# Patient Record
Sex: FEMALE | Race: WHITE | Employment: UNEMPLOYED | URBAN - METROPOLITAN AREA
[De-identification: names, ages, dates, MRNs, and addresses within clinical notes are randomized per-mention and may not be internally consistent; named-entity substitution may affect disease eponyms.]

---

## 2018-03-20 ENCOUNTER — OFFICE VISIT (OUTPATIENT)
Dept: ORTHOPEDIC SURGERY | Age: 58
End: 2018-03-20

## 2018-03-20 VITALS — WEIGHT: 190.04 LBS | HEIGHT: 65 IN | BODY MASS INDEX: 31.66 KG/M2

## 2018-03-20 DIAGNOSIS — R52 PAIN: Primary | ICD-10-CM

## 2018-03-20 DIAGNOSIS — M19.049 CMC ARTHRITIS: ICD-10-CM

## 2018-03-20 DIAGNOSIS — S63.602A SPRAIN OF LEFT THUMB, UNSPECIFIED SITE OF FINGER, INITIAL ENCOUNTER: ICD-10-CM

## 2018-03-20 PROCEDURE — G8484 FLU IMMUNIZE NO ADMIN: HCPCS | Performed by: ORTHOPAEDIC SURGERY

## 2018-03-20 PROCEDURE — G8417 CALC BMI ABV UP PARAM F/U: HCPCS | Performed by: ORTHOPAEDIC SURGERY

## 2018-03-20 PROCEDURE — 99203 OFFICE O/P NEW LOW 30 MIN: CPT | Performed by: ORTHOPAEDIC SURGERY

## 2018-03-20 PROCEDURE — 3017F COLORECTAL CA SCREEN DOC REV: CPT | Performed by: ORTHOPAEDIC SURGERY

## 2018-03-20 PROCEDURE — 3014F SCREEN MAMMO DOC REV: CPT | Performed by: ORTHOPAEDIC SURGERY

## 2018-03-20 PROCEDURE — G8427 DOCREV CUR MEDS BY ELIG CLIN: HCPCS | Performed by: ORTHOPAEDIC SURGERY

## 2018-03-27 ENCOUNTER — HOSPITAL ENCOUNTER (OUTPATIENT)
Dept: MRI IMAGING | Age: 58
Discharge: HOME | End: 2018-03-27
Attending: ORTHOPAEDIC SURGERY | Admitting: ORTHOPAEDIC SURGERY

## 2018-03-27 DIAGNOSIS — S63.602A SPRAIN OF LEFT THUMB: ICD-10-CM

## 2018-03-27 DIAGNOSIS — S63.602A SPRAIN OF LEFT THUMB, UNSPECIFIED SITE OF FINGER, INITIAL ENCOUNTER: ICD-10-CM

## 2018-04-09 ENCOUNTER — OFFICE VISIT (OUTPATIENT)
Dept: ORTHOPEDIC SURGERY | Age: 58
End: 2018-04-09

## 2018-04-09 VITALS — WEIGHT: 190.04 LBS | BODY MASS INDEX: 31.66 KG/M2 | HEIGHT: 65 IN

## 2018-04-09 DIAGNOSIS — M19.049 CMC ARTHRITIS: Primary | ICD-10-CM

## 2018-04-09 DIAGNOSIS — M79.645 THUMB PAIN, LEFT: ICD-10-CM

## 2018-04-09 DIAGNOSIS — S63.602A SPRAIN OF LEFT THUMB, UNSPECIFIED SITE OF FINGER, INITIAL ENCOUNTER: ICD-10-CM

## 2018-04-09 PROCEDURE — 3014F SCREEN MAMMO DOC REV: CPT | Performed by: ORTHOPAEDIC SURGERY

## 2018-04-09 PROCEDURE — 3017F COLORECTAL CA SCREEN DOC REV: CPT | Performed by: ORTHOPAEDIC SURGERY

## 2018-04-09 PROCEDURE — G8417 CALC BMI ABV UP PARAM F/U: HCPCS | Performed by: ORTHOPAEDIC SURGERY

## 2018-04-09 PROCEDURE — G8427 DOCREV CUR MEDS BY ELIG CLIN: HCPCS | Performed by: ORTHOPAEDIC SURGERY

## 2018-04-09 PROCEDURE — 99213 OFFICE O/P EST LOW 20 MIN: CPT | Performed by: ORTHOPAEDIC SURGERY

## 2018-04-09 PROCEDURE — 1036F TOBACCO NON-USER: CPT | Performed by: ORTHOPAEDIC SURGERY

## 2019-02-26 ENCOUNTER — TELEPHONE (OUTPATIENT)
Dept: ORTHOPEDIC SURGERY | Age: 59
End: 2019-02-26

## 2019-05-06 ENCOUNTER — HOSPITAL ENCOUNTER (EMERGENCY)
Age: 59
Discharge: HOME | End: 2019-05-06
Payer: MEDICARE

## 2019-05-06 VITALS
RESPIRATION RATE: 20 BRPM | WEIGHT: 179.13 LBS | BODY MASS INDEX: 29.84 KG/M2 | HEART RATE: 89 BPM | TEMPERATURE: 98.2 F | HEIGHT: 65 IN | SYSTOLIC BLOOD PRESSURE: 129 MMHG | DIASTOLIC BLOOD PRESSURE: 88 MMHG | OXYGEN SATURATION: 99 %

## 2019-05-06 DIAGNOSIS — H61.23 BILATERAL IMPACTED CERUMEN: Primary | ICD-10-CM

## 2019-05-06 PROCEDURE — 99283 EMERGENCY DEPT VISIT LOW MDM: CPT

## 2019-05-06 PROCEDURE — 4500000023 HC ED LEVEL 3 PROCEDURE

## 2019-05-06 NOTE — ED PROVIDER NOTES
[facility] Emergency Department    CHIEF COMPLAINT  Ear Fullness (ear fullness for past several days patient reports having to have ears cleaned out before )      HISTORY OF PRESENT ILLNESS  Lizbeth Altman is a 66 y.o. female who presents to the ED complaining of ear fullness and needing ears cleaned out over the past 2-3 days she has noticed worsening symptoms. Patient reports she has a history of cerumen impaction has seen ear nose and throat in the past. Patient reports that her ENT doctor retired and she has not followed up with anyone new. Patient reports that left ear feels more full than right but both have had fullness over the past 2 days. No dizziness or lightheadedness. No headache or blurred vision. No fever or chills. No sore throat or otalgia. No chest pain or shortness breath. No nausea, vomiting, or diarrhea. No other complaints, modifying factors or associated symptoms. Nursing notes reviewed. Past Medical History:   Diagnosis Date    Hypertension     Influenza A 03/23/2017    Thyroid disease      Past Surgical History:   Procedure Laterality Date    APPENDECTOMY      CHOLECYSTECTOMY      HYSTERECTOMY       History reviewed. No pertinent family history.   Social History     Socioeconomic History    Marital status:      Spouse name: Not on file    Number of children: Not on file    Years of education: Not on file    Highest education level: Not on file   Occupational History    Not on file   Social Needs    Financial resource strain: Not on file    Food insecurity:     Worry: Not on file     Inability: Not on file    Transportation needs:     Medical: Not on file     Non-medical: Not on file   Tobacco Use    Smoking status: Former Smoker    Smokeless tobacco: Never Used   Substance and Sexual Activity    Alcohol use: No    Drug use: No    Sexual activity: Never   Lifestyle    Physical activity:     Days per week: Not on file     Minutes per session: Not on file    Stress: Not on file   Relationships    Social connections:     Talks on phone: Not on file     Gets together: Not on file     Attends Gnosticist service: Not on file     Active member of club or organization: Not on file     Attends meetings of clubs or organizations: Not on file     Relationship status: Not on file    Intimate partner violence:     Fear of current or ex partner: Not on file     Emotionally abused: Not on file     Physically abused: Not on file     Forced sexual activity: Not on file   Other Topics Concern    Not on file   Social History Narrative    Not on file     No current facility-administered medications for this encounter. Current Outpatient Medications   Medication Sig Dispense Refill    gabapentin (NEURONTIN) 400 MG capsule Take 400 mg by mouth 3 times daily as needed      phenol (SORE THROAT) 1.4 % AERS Take 1 spray by mouth every 2 hours as needed (sore throat) 117 mL 0    sodium chloride (OCEAN) 0.65 % nasal spray 1 spray by Nasal route as needed for Congestion 1 Bottle 3    lisinopril (PRINIVIL;ZESTRIL) 10 MG tablet Take 20 mg by mouth daily.  levothyroxine (SYNTHROID) 150 MCG tablet Take 125 mcg by mouth Daily. Allergies   Allergen Reactions    Sulfa Antibiotics        REVIEW OF SYSTEMS  6 systems reviewed, pertinent positives per HPI otherwise noted to be negative    PHYSICAL EXAM  /88   Pulse 89   Temp 98.2 °F (36.8 °C) (Oral)   Resp 20   Ht 5' 5\" (1.651 m)   Wt 179 lb 2 oz (81.3 kg)   SpO2 99%   BMI 29.81 kg/m²   GENERAL APPEARANCE: Awake and alert. Cooperative. No acute distress. HEAD: Normocephalic. Atraumatic. EYES: PERRL. EOM's grossly intact. ENT: Mucous membranes are pink and moist. Nares unobstructed, right TM intact with slight cerumen present to auditory canal, no erythema or exudate present. Left TM unable to visualize due to cerumen impaction. NECK: Supple. Normal ROM.    CHEST: Equal symmetric chest rise.   LUNGS: Breathing is unlabored. Speaking comfortably in full sentences. Abdomen: Nondistended  EXTREMITIES: MAEE. No acute deformities. SKIN: Warm and dry. NEUROLOGICAL: Alert and oriented. Strength is 5/5 in all extremities and sensation is intact. RADIOLOGY  No radiological imaging needed at this time. .      ED COURSE   I have evaluated this patient on my own per my scope of practice. Supervising physician available during ED course of treatment for consultation     Vital signs stable. Patient did not require pain medications while in the [facility] department. Ears were irrigated per RN and patient tolerated well. Moderate amount of cerumen was removed. After cerumen impaction performed I was able to visualize bilateral TMs which appear to be intact. No significant erythema or bulging noted. Slight excoriation noted to auditory canal otherwise no exudates or abnormalities noted. Patient was instructed not to stick anything in to auditory canals and follow up with ENT for frequent cerumen impaction. A discussion was had with Mrs. Hortensia Diaz regarding the findings, results, and follow-up. All questions were answered. Patient will follow up with  PCP and ENT for further evaluation/treatment. Patient will return to ED for new/worsening symptoms. MDM  I estimate there is LOW risk for CELLULITIS, COMPARTMENT SYNDROME, NECROTIZING FASCIITIS, TENDON OR NEUROVASCULAR INJURY, or FOREIGN BODY, thus I consider the discharge disposition reasonable. Also, there is no evidence or peritonitis, sepsis, or toxicity. Tiffanie FLOREZ [id number] and I have discussed the diagnosis and risks, and we agree with discharging home to follow-up with their primary doctor. We also discussed returning to the Emergency Department immediately if new or worsening symptoms occur.  We have discussed the symptoms which are most concerning (e.g., changing or worsening pain, fever, numbness, weakness, cool or painful digits) that necessitate immediate return. Final Impression    1. Bilateral impacted cerumen        Discharge Vital Signs:  Blood pressure 129/88, pulse 89, temperature 98.2 °F (36.8 °C), temperature source Oral, resp. rate 20, height 5' 5\" (1.651 m), weight 179 lb 2 oz (81.3 kg), SpO2 99 %. DISPOSITION  Patient was discharged to home in good condition.           Mathew Rivera, ASHLEY - CNP  05/06/19 [id number]

## 2019-05-06 NOTE — ED NOTES
Pt instructed to follow up with [facility]. Assessed per Jory Bellamy NP.      Arlene Washburn LPN  95/77/99 [id number]

## 2019-07-15 ENCOUNTER — INITIAL CONSULT (OUTPATIENT)
Dept: SURGERY | Age: 59
End: 2019-07-15
Payer: MEDICARE

## 2019-07-15 VITALS
BODY MASS INDEX: 29.59 KG/M2 | WEIGHT: 177.6 LBS | HEIGHT: 65 IN | DIASTOLIC BLOOD PRESSURE: 68 MMHG | SYSTOLIC BLOOD PRESSURE: 116 MMHG

## 2019-07-15 DIAGNOSIS — K61.1 PERIRECTAL ABSCESS: Primary | ICD-10-CM

## 2019-07-15 PROCEDURE — G8427 DOCREV CUR MEDS BY ELIG CLIN: HCPCS | Performed by: SURGERY

## 2019-07-15 PROCEDURE — 99202 OFFICE O/P NEW SF 15 MIN: CPT | Performed by: SURGERY

## 2019-07-15 PROCEDURE — G8419 CALC BMI OUT NRM PARAM NOF/U: HCPCS | Performed by: SURGERY

## 2019-07-15 RX ORDER — CLINDAMYCIN HYDROCHLORIDE 300 MG/1
CAPSULE ORAL
COMMUNITY
Start: 2019-07-12 | End: 2019-07-15 | Stop reason: SDUPTHER

## 2019-07-15 RX ORDER — CLINDAMYCIN HYDROCHLORIDE 300 MG/1
300 CAPSULE ORAL 3 TIMES DAILY
Qty: 21 CAPSULE | Refills: 0 | Status: SHIPPED | OUTPATIENT
Start: 2019-07-15 | End: 2021-07-08

## 2019-07-28 NOTE — PROGRESS NOTES
[facility]    HPI:  Patient is 66y.o. year old female seen at request of Blair Perla MD.  She presents for evaluation of rectal pain. There has not been bleeding. There has been recent infection that drained spontaneously. She is taking antibiotics. She states things are much improved. .  The symptoms have been occurring for weeks. Antibiotic treatments have been tried. Past Medical History:   Diagnosis Date    Hypertension     Influenza A 03/23/2017    Thyroid disease        Past Surgical History:   Procedure Laterality Date    APPENDECTOMY      CHOLECYSTECTOMY      HYSTERECTOMY         Current Outpatient Medications on File Prior to Visit   Medication Sig Dispense Refill    lisinopril (PRINIVIL;ZESTRIL) 10 MG tablet Take 20 mg by mouth daily.  levothyroxine (SYNTHROID) 150 MCG tablet Take 125 mcg by mouth Daily.  gabapentin (NEURONTIN) 400 MG capsule Take 400 mg by mouth 3 times daily as needed      phenol (SORE THROAT) 1.4 % AERS Take 1 spray by mouth every 2 hours as needed (sore throat) (Patient not taking: Reported on 7/15/2019) 117 mL 0    sodium chloride (OCEAN) 0.65 % nasal spray 1 spray by Nasal route as needed for Congestion (Patient not taking: Reported on 7/15/2019) 1 Bottle 3     No current facility-administered medications on file prior to visit.         Allergies   Allergen Reactions    Sulfa Antibiotics        Social History     Socioeconomic History    Marital status:      Spouse name: Not on file    Number of children: Not on file    Years of education: Not on file    Highest education level: Not on file   Occupational History    Not on file   Social Needs    Financial resource strain: Not on file    Food insecurity:     Worry: Not on file     Inability: Not on file    Transportation needs:     Medical: Not on file     Non-medical: Not on file   Tobacco Use    Smoking status: Former Smoker    Smokeless tobacco: Never Used

## 2021-02-23 ENCOUNTER — TELEPHONE (OUTPATIENT)
Dept: ORTHOPEDIC SURGERY | Age: 61
End: 2021-02-23

## 2021-02-23 ENCOUNTER — OFFICE VISIT (OUTPATIENT)
Dept: ORTHOPEDIC SURGERY | Age: 61
End: 2021-02-23
Payer: MEDICARE

## 2021-02-23 VITALS — BODY MASS INDEX: 29.49 KG/M2 | HEIGHT: 65 IN | WEIGHT: 177 LBS

## 2021-02-23 DIAGNOSIS — S83.421A SPRAIN OF LATERAL COLLATERAL LIGAMENT OF RIGHT KNEE, INITIAL ENCOUNTER: ICD-10-CM

## 2021-02-23 DIAGNOSIS — M17.11 PRIMARY OSTEOARTHRITIS OF RIGHT KNEE: ICD-10-CM

## 2021-02-23 DIAGNOSIS — M25.561 RIGHT KNEE PAIN, UNSPECIFIED CHRONICITY: Primary | ICD-10-CM

## 2021-02-23 DIAGNOSIS — M22.41 CHONDROMALACIA OF RIGHT PATELLOFEMORAL JOINT: ICD-10-CM

## 2021-02-23 PROCEDURE — L1812 KO ELASTIC W/JOINTS PRE OTS: HCPCS | Performed by: PHYSICIAN ASSISTANT

## 2021-02-23 PROCEDURE — 99214 OFFICE O/P EST MOD 30 MIN: CPT | Performed by: PHYSICIAN ASSISTANT

## 2021-02-23 NOTE — PROGRESS NOTES
Chief Complaint    Knee Pain (RIGHT KNEE)      History of Present Illness:  Lennox Sawant is a 64 y.o. female presents to the office today for a new problem. Patient sent in orthopedic consultation for Dr. Carlotta Wilkerson. Patient is here with a chief complaint of right lateral knee pain. Around 2/18/2021 she did suffer an injury. She describes a hyperextension injury. Her pain is concentrated laterally. She was referred to us by her primary care physician for orthopedic care. No past medical history contributing to the region. Pain Assessment  Location of Pain: Knee  Location Modifiers: Right  Severity of Pain: 7  Quality of Pain: Dull, Sharp, Throbbing, Aching  Duration of Pain: Persistent  Frequency of Pain: Constant  Aggravating Factors: Bending, Stretching, Straightening, Exercise, Kneeling, Squatting, Standing, Stairs, Walking  Limiting Behavior: Yes  Relieving Factors: Rest  Result of Injury: Yes  Work-Related Injury: No  Are there other pain locations you wish to document?: No]    Medical History:  Past Medical History:   Diagnosis Date    Hypertension     Influenza A 03/23/2017    Thyroid disease      There are no active problems to display for this patient. Past Surgical History:   Procedure Laterality Date    APPENDECTOMY      CHOLECYSTECTOMY      HYSTERECTOMY       No family history on file.   Social History     Socioeconomic History    Marital status:      Spouse name: None    Number of children: None    Years of education: None    Highest education level: None   Occupational History    None   Social Needs    Financial resource strain: None    Food insecurity     Worry: None     Inability: None    Transportation needs     Medical: None     Non-medical: None   Tobacco Use    Smoking status: Former Smoker    Smokeless tobacco: Never Used   Substance and Sexual Activity    Alcohol use: No    Drug use: No    Sexual activity: Never   Lifestyle    Physical activity Neurological: The patient has good coordination. There is no weakness or sensory deficit. Body mass index is 29.45 kg/m². Right knee Examination:    Inspection:  No erythema or signs of infection. There are no cutaneous lesions    Palpation:  There is tenderness to palpation along the lateral joint line. Range of Motion:  0 extension to 120 of active flexion. Strength:  4+/5 quadriceps and hamstrings    Special Tests: The knee is stable to varus valgus stressing/anterior posterior drawer. Negative Homans test.                                 Skin: There are no rashes, ulcerations or lesions. Gait: mildly antalgic favoring the left side    Reflex 2+ patellar    Examination of the left knee reveals intact skin. There is no focal tenderness. The patient demonstrates full painless range of motion with regard to flexion and extension. Strength is 5/5 throughout all planes. Ligamentous stability is grossly intact. Examination of the right and left hip reveals intact skin. The patient demonstrates full painless range of motion with regards to flexion, abduction, internal and external rotation. There is no tenderness about the greater trochanter. There is a negative straight leg raise against resistance. Strength is 5/5 throughout all planes. Radiology:   X-rays obtained and reviewed in office:  Views 4 views including AP weightbearing, PA flexed weightbearing, lateral, and skyline  Location right knee:    Impression:   There is mild to moderate thinning of her lateral joint surface and patellofemoral joint surface with sclerosis and osteophyte formation present. No fractures are identified. Impression:  Encounter Diagnoses   Name Primary?     Right knee pain, unspecified chronicity Yes    Primary osteoarthritis of right knee     Sprain of lateral collateral ligament of right knee, initial encounter     Chondromalacia of right patellofemoral joint        Office Procedures: Orders Placed This Encounter   Procedures    XR KNEE RIGHT (MIN 4 VIEWS)     Standing Status:   Future     Number of Occurrences:   1     Standing Expiration Date:   2/23/2022     Order Specific Question:   Reason for exam:     Answer:   pain    MRI KNEE RIGHT WO CONTRAST     Standing Status:   Future     Standing Expiration Date:   2/23/2022     Scheduling Instructions:      NIA ALBERT     Order Specific Question:   Reason for exam:     Answer:   r/o occult lateral tibial plateau fx    Breg Economy Hinged Knee WrapAround Brace     Patient was prescribed a Breg Economy Hinged Wrap Around Knee Brace. The right knee will require stabilization / immobilization from this semi-rigid / rigid orthosis to improve their function. The orthosis will assist in protecting the affected area, provide functional support and facilitate healing. The patient was educated and fit by a healthcare professional with expert knowledge and specialization in brace application while under the direct supervision of the treating physician. Verbal and written instructions for the use of and application of this item were provided. They were instructed to contact the office immediately should the brace result in increased pain, decreased sensation, increased swelling or worsening of the condition. Treatment Plan:  I discussed with the patient the nature of osteoarthritis of the knee. We talked about treatment of arthritis and the various options that are involved with this. The patient understands that the treatments can vary from essentially doing nothing to a total joint replacement arthroplasty for arthritis. I then went on to describe the utilization of glucosamine and chondroitin sulfate as a joint nutrition product. We talked about the fact that this is essentially a joint vitamin with typically minimal side effects. We also talked about utilization of prescription over-the-counter anti-inflammatory medications as the next option. We also discussed the possibility of brace wear or orthotic wear if the patient has significant varus alignment. We then went on to discuss the possibility of Visco supplementation with hyaluronate products. We talked about the typical course of this type of treatment and the fact that often times in the treatment for significant arthritis, this is successful less than half the time. We also talked about the corticosteroid injections and the fact that this can give a brief window of relief, but does not cure the problem; in fact, the pain often has a rebound effect in 6-10 weeks after the steroid has worn off. We also discussed arthroscopy surgery in attempts to debride the joint, but the fact that this is relatively unreliable treatment in the face of significant arthritis. It can occasionally be used, particularly if there is significant meniscus pathology. Lastly we discussed total joint replacement arthroplasty as the final and definitive step in treatment of arthritis. Patient realizes the magnitude of this type of treatment as well as having voiced a general understanding to the duration of the prosthesis. The patient voiced understanding to these continuum of treatment options. Patient does have at least moderate osteoarthritis involving the lateral compartment of her knee. Ligaments appear stable at this time. She may have a mild LCL tear. I am more concerned about an occult fracture of her lateral femoral condyle and lateral tibial plateau. She will be placed in a economy hinged knee brace. She will be no more than 50% weightbearing with 2 crutches and knee brace at all times. She should continue to rest ice and elevate. We will order her an MRI to evaluate for above pathology. She is also asked for additional pain medication which I do not feel is appropriate at this time. She can continue to take over-the-counter medications and use ice as needed for pain control. The brace and crutches should also help with pain control. We will see her back in the office after MRI to review results.

## 2021-02-23 NOTE — TELEPHONE ENCOUNTER
SPOKE WITH PATIENT, WILL CALL TO SCHEDULE MRI WITH NIA. WILL CALL TO SCHEDULE FOLLOW UP APPT FOR RESULTS.

## 2021-02-26 ENCOUNTER — TELEPHONE (OUTPATIENT)
Dept: ORTHOPEDIC SURGERY | Age: 61
End: 2021-02-26

## 2021-03-03 ENCOUNTER — TELEPHONE (OUTPATIENT)
Dept: ORTHOPEDIC SURGERY | Age: 61
End: 2021-03-03

## 2021-03-03 ENCOUNTER — OFFICE VISIT (OUTPATIENT)
Dept: ORTHOPEDIC SURGERY | Age: 61
End: 2021-03-03
Payer: MEDICARE

## 2021-03-03 VITALS — HEIGHT: 65 IN | WEIGHT: 177 LBS | BODY MASS INDEX: 29.49 KG/M2

## 2021-03-03 DIAGNOSIS — M17.11 PRIMARY OSTEOARTHRITIS OF RIGHT KNEE: Primary | ICD-10-CM

## 2021-03-03 DIAGNOSIS — M22.41 CHONDROMALACIA OF RIGHT PATELLOFEMORAL JOINT: ICD-10-CM

## 2021-03-03 DIAGNOSIS — M23.300 DEGENERATIVE TEAR OF LATERAL MENISCUS OF RIGHT KNEE: ICD-10-CM

## 2021-03-03 PROCEDURE — 99214 OFFICE O/P EST MOD 30 MIN: CPT | Performed by: PHYSICIAN ASSISTANT

## 2021-03-03 PROCEDURE — 20611 DRAIN/INJ JOINT/BURSA W/US: CPT | Performed by: PHYSICIAN ASSISTANT

## 2021-03-03 RX ORDER — BUPIVACAINE HYDROCHLORIDE 2.5 MG/ML
10 INJECTION, SOLUTION INFILTRATION; PERINEURAL ONCE
Status: COMPLETED | OUTPATIENT
Start: 2021-03-03 | End: 2021-03-03

## 2021-03-03 RX ORDER — TRIAMCINOLONE ACETONIDE 40 MG/ML
80 INJECTION, SUSPENSION INTRA-ARTICULAR; INTRAMUSCULAR ONCE
Status: COMPLETED | OUTPATIENT
Start: 2021-03-03 | End: 2021-03-03

## 2021-03-03 RX ORDER — LIDOCAINE HYDROCHLORIDE 10 MG/ML
40 INJECTION, SOLUTION INFILTRATION; PERINEURAL ONCE
Status: COMPLETED | OUTPATIENT
Start: 2021-03-03 | End: 2021-03-03

## 2021-03-03 RX ADMIN — LIDOCAINE HYDROCHLORIDE 40 MG: 10 INJECTION, SOLUTION INFILTRATION; PERINEURAL at 11:25

## 2021-03-03 RX ADMIN — BUPIVACAINE HYDROCHLORIDE 10 MG: 2.5 INJECTION, SOLUTION INFILTRATION; PERINEURAL at 11:24

## 2021-03-03 RX ADMIN — TRIAMCINOLONE ACETONIDE 80 MG: 40 INJECTION, SUSPENSION INTRA-ARTICULAR; INTRAMUSCULAR at 11:24

## 2021-03-03 NOTE — PROGRESS NOTES
Chief Complaint    Results (TR MRI RIGHT KNEE)      History of Present Illness:  Lena Hernández is a 64 y.o. female presents to the office today for a follow-up visit. Patient is here with a chief complaint of right lateral knee pain. Around 2/18/2021 she did suffer an injury. She describes a hyperextension injury. Her pain is concentrated laterally. She was referred to us by her primary care physician for orthopedic care. No past medical history contributing to the region. At her last visit we did order her an MRI. MRI to evaluate for meniscal pathology along with possible microtrabecular fracture. Patient is here for MRI results. She does state that the brace that we gave her last time was helpful for her. She denies any locking or instability issues. Pain Assessment  Location of Pain: Knee  Location Modifiers: Right  Severity of Pain: 8  Quality of Pain: Aching  Duration of Pain: Persistent  Frequency of Pain: Constant]    Medical History:  Past Medical History:   Diagnosis Date    Hypertension     Influenza A 03/23/2017    Thyroid disease      There are no active problems to display for this patient. Past Surgical History:   Procedure Laterality Date    APPENDECTOMY      CHOLECYSTECTOMY      HYSTERECTOMY       No family history on file.   Social History     Socioeconomic History    Marital status:      Spouse name: None    Number of children: None    Years of education: None    Highest education level: None   Occupational History    None   Social Needs    Financial resource strain: None    Food insecurity     Worry: None     Inability: None    Transportation needs     Medical: None     Non-medical: None   Tobacco Use    Smoking status: Former Smoker    Smokeless tobacco: Never Used   Substance and Sexual Activity    Alcohol use: No    Drug use: No    Sexual activity: Never   Lifestyle    Physical activity     Days per week: None     Minutes per session: None    Stress: None   Relationships    Social connections     Talks on phone: None     Gets together: None     Attends Rastafarian service: None     Active member of club or organization: None     Attends meetings of clubs or organizations: None     Relationship status: None    Intimate partner violence     Fear of current or ex partner: None     Emotionally abused: None     Physically abused: None     Forced sexual activity: None   Other Topics Concern    None   Social History Narrative    None     Current Outpatient Medications   Medication Sig Dispense Refill    clindamycin (CLEOCIN) 300 MG capsule Take 1 capsule by mouth 3 times daily 21 capsule 0    gabapentin (NEURONTIN) 400 MG capsule Take 400 mg by mouth 3 times daily as needed      phenol (SORE THROAT) 1.4 % AERS Take 1 spray by mouth every 2 hours as needed (sore throat) (Patient not taking: Reported on 7/15/2019) 117 mL 0    sodium chloride (OCEAN) 0.65 % nasal spray 1 spray by Nasal route as needed for Congestion (Patient not taking: Reported on 7/15/2019) 1 Bottle 3    lisinopril (PRINIVIL;ZESTRIL) 10 MG tablet Take 20 mg by mouth daily.  levothyroxine (SYNTHROID) 150 MCG tablet Take 125 mcg by mouth Daily. No current facility-administered medications for this visit. Review of Systems:  Relevant review of systems reviewed and available in the patient's chart    Vital Signs: There were no vitals filed for this visit. General Exam:   Constitutional: Patient is adequately groomed with no evidence of malnutrition  DTRs: Deep tendon reflexes are intact  Mental Status: The patient is oriented to time, place and person. The patient's mood and affect are appropriate. Lymphatic: The lymphatic examination bilaterally reveals all areas to be without enlargement or induration. Vascular: Examination reveals no swelling or calf tenderness. Peripheral pulses are palpable and 2+. Neurological: The patient has good coordination.   There is no weakness or sensory deficit. Body mass index is 29.45 kg/m². Right knee Examination:    Inspection:  No erythema or signs of infection. There are no cutaneous lesions    Palpation:  There is tenderness to palpation along the lateral joint line. Range of Motion:  0 extension to 120 of active flexion. Strength:  4+/5 quadriceps and hamstrings    Special Tests: The knee is stable to varus valgus stressing/anterior posterior drawer. Negative Homans test.                                 Skin: There are no rashes, ulcerations or lesions. Gait: mildly antalgic favoring the left side    Reflex 2+ patellar    Examination of the left knee reveals intact skin. There is no focal tenderness. The patient demonstrates full painless range of motion with regard to flexion and extension. Strength is 5/5 throughout all planes. Ligamentous stability is grossly intact. Examination of the right and left hip reveals intact skin. The patient demonstrates full painless range of motion with regards to flexion, abduction, internal and external rotation. There is no tenderness about the greater trochanter. There is a negative straight leg raise against resistance. Strength is 5/5 throughout all planes. Radiology:     RADIOLOGY REPORT       Patient: Scottie FLORES(F)   MRN: [id number]   Location: [facility]   Referring Physician: Sole Wren   Account Number: [de-identified]   Exam Date/Time: Mar 2, 2021 07:28:37   : Sep 4, 1960   [place]. [facility] [id number]   Primary Physician: Ryan Razo   HX: PAIN       MR KNEE RIGHT WO -       MRI RIGHT KNEE       INDICATION: Right knee pain and weakness post trauma.       COMPARISON: Right knee films 2021.       TECHNIQUE: Multiplanar MR sequential MR imaging right knee without contrast.       FINDINGS: No evidence of any prominent joint effusion.       Focal marrow edema posterior tibial plateau.  This may be related to chondral loss at the tibial plateau rather than trauma.       No occult fracture.       There is focal intermediate increased signal in the anterior horn of the   lateral meniscus most consistent with moderate meniscal degeneration. Focal intermediate increased signal in the posterior horn of the lateral   meniscus may represent moderate degeneration or healing tear.       There is mild intermediate increased signal in the posterior horn of the   medial meniscus also most consistent with degeneration more rather than focal   tear. Normal anterior horn medial meniscus.       Mild increased signal anterior cruciate may represent ligament sprain without   focal tear. Normal posterior cruciate.       Normal quadriceps and inferior patellar tendons.       There is focal cartilage loss in the superior margin of the posterior patella   and subchondral edema most consistent with moderate chondromalacia.       There is focal cartilage loss in the lateral tibial plateau.       No evidence of a Baker's cyst.       Normal medial lateral collateral ligaments.       Normal lateral fascia.       Normal medial and lateral patellar retinaculum.       IMPRESSION: No evidence of any prominent joint effusion.       Focal marrow edema posterior tibial plateau. This may be related to chondral   loss at the tibial plateau rather than trauma.       No occult fracture.       There is focal intermediate increased signal in the anterior horn of the   lateral meniscus most consistent with moderate meniscal degeneration. Focal intermediate increased signal in the posterior horn of the lateral   meniscus may represent moderate degeneration or healing tear.       There is mild intermediate increased signal in the posterior horn of the   medial meniscus also most consistent with degeneration more rather than focal   tear.  Normal anterior horn medial meniscus.       Mild increased signal anterior cruciate may represent ligament sprain without   focal tear. Normal posterior cruciate.       Normal quadriceps and inferior patellar tendons.       There is focal cartilage loss in the superior margin of the posterior patella   and subchondral edema most consistent with moderate chondromalacia.       There is focal cartilage loss in the lateral tibial plateau. Impression:  Encounter Diagnoses   Name Primary?  Primary osteoarthritis of right knee Yes    Chondromalacia of right patellofemoral joint     Degenerative tear of lateral meniscus of right knee        Office Procedures:  Orders Placed This Encounter   Procedures    US ARTHR/ASP/INJ MAJOR JNT/BURSA RIGHT     Standing Status:   Future     Number of Occurrences:   1     Standing Expiration Date:   3/3/2022     Order Specific Question:   Reason for exam:     Answer:   Tammy Cowden, MD Orthopaedics and Sports Medicine, [facility]     Referral Priority:   Routine     Referral Type:   Eval and Treat     Referral Reason:   Specialty Services Required     Referred to Provider:   Cathy Mcdaniels MD     Requested Specialty:   Orthopedic Surgery     Number of Visits Requested:   1     Right knee cortisone injection    I discussed in detail the risks, benefits and complications of aninjection which included but are not limited to infection, skin reactions, hot swollen joint, and anaphylaxis with the patient. The patient verbalized understanding and gave informed consent for the right knee injection. The patient is placed supine on table with a bolster underneath knee. Knee prepped with Betadine/Sterile alcohol solution. A sterile 22-gauge needle was inserted into the knee and the mixture of 2ml knealog 40mg/cc, 4 mL of 1% plain lidocaine, and 4 cc .25% bupivacaine was injected under sterile technique. The needle was withdrawn and the puncture site sealed with a Band-Aid.      Technique: Under sterile conditions a SonCSDN ultrasound unit with a variable frequency (6.0-15.0 MHz) linear transducer was used to localize the placement of a 22-gauge needle into the knee joint. Findings: Successful needle placement for knee injection. Final images were taken and saved for permanent record. The patient tolerated the injection well. The patient was instructed to call the office immediately if there is any pain, redness, warmth, fever, or chills. Treatment Plan:  I discussed with the patient the nature of osteoarthritis of the knee. We talked about treatment of arthritis and the various options that are involved with this. The patient understands that the treatments can vary from essentially doing nothing to a total joint replacement arthroplasty for arthritis. I then went on to describe the utilization of glucosamine and chondroitin sulfate as a joint nutrition product. We talked about the fact that this is essentially a joint vitamin with typically minimal side effects. We also talked about utilization of prescription over-the-counter anti-inflammatory medications as the next option. We also discussed the possibility of brace wear or orthotic wear if the patient has significant varus alignment. We then went on to discuss the possibility of Visco supplementation with hyaluronate products. We talked about the typical course of this type of treatment and the fact that often times in the treatment for significant arthritis, this is successful less than half the time. We also talked about the corticosteroid injections and the fact that this can give a brief window of relief, but does not cure the problem; in fact, the pain often has a rebound effect in 6-10 weeks after the steroid has worn off. We also discussed arthroscopy surgery in attempts to debride the joint, but the fact that this is relatively unreliable treatment in the face of significant arthritis. It can occasionally be used, particularly if there is significant meniscus pathology.  Lastly we discussed total joint replacement arthroplasty as the final and definitive step in treatment of arthritis. Patient realizes the magnitude of this type of treatment as well as having voiced a general understanding to the duration of the prosthesis. The patient voiced understanding to these continuum of treatment options. Patient does have osteoarthritis of her left knee. We will perform an intra-articular cortisone injection under ultrasound guidance today. If she does not do well she may benefit from arthroscopic cleanout.   I will have her follow-up with Dr. Ladonna Goldberg in 4 weeks if needd

## 2021-03-29 ENCOUNTER — TELEPHONE (OUTPATIENT)
Dept: ORTHOPEDIC SURGERY | Age: 61
End: 2021-03-29

## 2021-03-29 NOTE — TELEPHONE ENCOUNTER
PATIENT IS NEEDING TO SPEAK TO SOMEONE REGARDING A GRIEVANCE ON HER BRACE. PLEASE CALL HER BACK [phone].

## 2021-04-05 ENCOUNTER — OFFICE VISIT (OUTPATIENT)
Dept: ORTHOPEDIC SURGERY | Age: 61
End: 2021-04-05
Payer: MEDICARE

## 2021-04-05 VITALS — BODY MASS INDEX: 29.49 KG/M2 | WEIGHT: 177 LBS | HEIGHT: 65 IN

## 2021-04-05 DIAGNOSIS — M17.11 PRIMARY OSTEOARTHRITIS OF RIGHT KNEE: Primary | ICD-10-CM

## 2021-04-05 DIAGNOSIS — M22.41 CHONDROMALACIA OF RIGHT PATELLOFEMORAL JOINT: ICD-10-CM

## 2021-04-05 DIAGNOSIS — M23.300 DEGENERATIVE TEAR OF LATERAL MENISCUS OF RIGHT KNEE: ICD-10-CM

## 2021-04-05 PROCEDURE — 99213 OFFICE O/P EST LOW 20 MIN: CPT | Performed by: PHYSICIAN ASSISTANT

## 2021-04-05 NOTE — PROGRESS NOTES
Chief Complaint    Knee Pain (CK RIGHT KNEE, CORTISONE INJ 3/3/21)      History of Present Illness:  Flor Valera is a 64 y.o. female today patient is here for follow-up visit. The cortisone injection which was given to her at the last visit did help her. She is still questioning about a new brace. I have told her that we already gave her a brace and her insurance company will not pay for another one. Previous history: Patient presents to the office today for a follow-up visit. Patient is here with a chief complaint of right lateral knee pain. Around 2/18/2021 she did suffer an injury. She describes a hyperextension injury. Her pain is concentrated laterally. She was referred to us by her primary care physician for orthopedic care. No past medical history contributing to the region. At her last visit we did order her an MRI. MRI to evaluate for meniscal pathology along with possible microtrabecular fracture. Patient is here for MRI results. She does state that the brace that we gave her last time was helpful for her. She denies any locking or instability issues. Pain Assessment  Location of Pain: Knee  Location Modifiers: Right  Severity of Pain: 7  Quality of Pain: Aching  Duration of Pain: Persistent  Frequency of Pain: Constant]    Medical History:  Past Medical History:   Diagnosis Date    Hypertension     Influenza A 03/23/2017    Thyroid disease      There are no active problems to display for this patient. Past Surgical History:   Procedure Laterality Date    APPENDECTOMY      CHOLECYSTECTOMY      HYSTERECTOMY       No family history on file.   Social History     Socioeconomic History    Marital status:      Spouse name: None    Number of children: None    Years of education: None    Highest education level: None   Occupational History    None   Social Needs    Financial resource strain: None    Food insecurity     Worry: None     Inability: None    Transportation needs     Medical: None     Non-medical: None   Tobacco Use    Smoking status: Former Smoker    Smokeless tobacco: Never Used   Substance and Sexual Activity    Alcohol use: No    Drug use: No    Sexual activity: Never   Lifestyle    Physical activity     Days per week: None     Minutes per session: None    Stress: None   Relationships    Social connections     Talks on phone: None     Gets together: None     Attends Christianity service: None     Active member of club or organization: None     Attends meetings of clubs or organizations: None     Relationship status: None    Intimate partner violence     Fear of current or ex partner: None     Emotionally abused: None     Physically abused: None     Forced sexual activity: None   Other Topics Concern    None   Social History Narrative    None     Current Outpatient Medications   Medication Sig Dispense Refill    clindamycin (CLEOCIN) 300 MG capsule Take 1 capsule by mouth 3 times daily 21 capsule 0    gabapentin (NEURONTIN) 400 MG capsule Take 400 mg by mouth 3 times daily as needed      phenol (SORE THROAT) 1.4 % AERS Take 1 spray by mouth every 2 hours as needed (sore throat) (Patient not taking: Reported on 7/15/2019) 117 mL 0    sodium chloride (OCEAN) 0.65 % nasal spray 1 spray by Nasal route as needed for Congestion (Patient not taking: Reported on 7/15/2019) 1 Bottle 3    lisinopril (PRINIVIL;ZESTRIL) 10 MG tablet Take 20 mg by mouth daily.  levothyroxine (SYNTHROID) 150 MCG tablet Take 125 mcg by mouth Daily. No current facility-administered medications for this visit. Review of Systems:  Relevant review of systems reviewed and available in the patient's chart    Vital Signs: There were no vitals filed for this visit. General Exam:   Constitutional: Patient is adequately groomed with no evidence of malnutrition  DTRs: Deep tendon reflexes are intact  Mental Status:  The patient is oriented to time, place and person. The patient's mood and affect are appropriate. Lymphatic: The lymphatic examination bilaterally reveals all areas to be without enlargement or induration. Vascular: Examination reveals no swelling or calf tenderness. Peripheral pulses are palpable and 2+. Neurological: The patient has good coordination. There is no weakness or sensory deficit. Body mass index is 29.45 kg/m². Right knee Examination:    Inspection:  No erythema or signs of infection. There are no cutaneous lesions    Palpation:  There is tenderness to palpation along the lateral joint line. Range of Motion:  0 extension to 120 of active flexion. Strength:  4+/5 quadriceps and hamstrings    Special Tests: The knee is stable to varus valgus stressing/anterior posterior drawer. Negative Homans test.                                 Skin: There are no rashes, ulcerations or lesions. Gait: mildly antalgic favoring the left side    Reflex 2+ patellar    Examination of the left knee reveals intact skin. There is no focal tenderness. The patient demonstrates full painless range of motion with regard to flexion and extension. Strength is 5/5 throughout all planes. Ligamentous stability is grossly intact. Examination of the right and left hip reveals intact skin. The patient demonstrates full painless range of motion with regards to flexion, abduction, internal and external rotation. There is no tenderness about the greater trochanter. There is a negative straight leg raise against resistance. Strength is 5/5 throughout all planes. Radiology:     RADIOLOGY REPORT       Patient: Rhonda FLORES(DIANA)   MRN: [id number]   Location: [facility]   Referring Physician: Eagle Domínguez   Account Number: [de-identified]   Exam Date/Time: Mar 2, 2021 07:28:37   : Sep 4, 1960   Leela Bell.  [facility] [id number]   Primary Physician: Ada Razo   HX: PAIN       MR KNEE RIGHT WO -       MRI RIGHT KNEE     INDICATION: Right knee pain and weakness post trauma.       COMPARISON: Right knee films 2/18/2021.       TECHNIQUE: Multiplanar MR sequential MR imaging right knee without contrast.       FINDINGS: No evidence of any prominent joint effusion.       Focal marrow edema posterior tibial plateau. This may be related to chondral   loss at the tibial plateau rather than trauma.       No occult fracture.       There is focal intermediate increased signal in the anterior horn of the   lateral meniscus most consistent with moderate meniscal degeneration. Focal intermediate increased signal in the posterior horn of the lateral   meniscus may represent moderate degeneration or healing tear.       There is mild intermediate increased signal in the posterior horn of the   medial meniscus also most consistent with degeneration more rather than focal   tear. Normal anterior horn medial meniscus.       Mild increased signal anterior cruciate may represent ligament sprain without   focal tear. Normal posterior cruciate.       Normal quadriceps and inferior patellar tendons.       There is focal cartilage loss in the superior margin of the posterior patella   and subchondral edema most consistent with moderate chondromalacia.       There is focal cartilage loss in the lateral tibial plateau.       No evidence of a Baker's cyst.       Normal medial lateral collateral ligaments.       Normal lateral fascia.       Normal medial and lateral patellar retinaculum.       IMPRESSION: No evidence of any prominent joint effusion.       Focal marrow edema posterior tibial plateau. This may be related to chondral   loss at the tibial plateau rather than trauma.       No occult fracture.       There is focal intermediate increased signal in the anterior horn of the   lateral meniscus most consistent with moderate meniscal degeneration.    Focal intermediate increased signal in the posterior horn of the lateral   meniscus may represent moderate degeneration or healing tear.       There is mild intermediate increased signal in the posterior horn of the   medial meniscus also most consistent with degeneration more rather than focal   tear. Normal anterior horn medial meniscus.       Mild increased signal anterior cruciate may represent ligament sprain without   focal tear. Normal posterior cruciate.       Normal quadriceps and inferior patellar tendons.       There is focal cartilage loss in the superior margin of the posterior patella   and subchondral edema most consistent with moderate chondromalacia.       There is focal cartilage loss in the lateral tibial plateau. Impression:  Encounter Diagnoses   Name Primary?  Primary osteoarthritis of right knee Yes    Chondromalacia of right patellofemoral joint     Degenerative tear of lateral meniscus of right knee        Office Procedures:  No orders of the defined types were placed in this encounter. Treatment Plan:  I discussed with the patient the nature of osteoarthritis of the knee. We talked about treatment of arthritis and the various options that are involved with this. The patient understands that the treatments can vary from essentially doing nothing to a total joint replacement arthroplasty for arthritis. I then went on to describe the utilization of glucosamine and chondroitin sulfate as a joint nutrition product. We talked about the fact that this is essentially a joint vitamin with typically minimal side effects. We also talked about utilization of prescription over-the-counter anti-inflammatory medications as the next option. We also discussed the possibility of brace wear or orthotic wear if the patient has significant varus alignment. We then went on to discuss the possibility of Visco supplementation with hyaluronate products.  We talked about the typical course of this type of treatment and the fact that often times in the treatment for significant arthritis, this is successful less than half the time. We also talked about the corticosteroid injections and the fact that this can give a brief window of relief, but does not cure the problem; in fact, the pain often has a rebound effect in 6-10 weeks after the steroid has worn off. We also discussed arthroscopy surgery in attempts to debride the joint, but the fact that this is relatively unreliable treatment in the face of significant arthritis. It can occasionally be used, particularly if there is significant meniscus pathology. Lastly we discussed total joint replacement arthroplasty as the final and definitive step in treatment of arthritis. Patient realizes the magnitude of this type of treatment as well as having voiced a general understanding to the duration of the prosthesis. The patient voiced understanding to these continuum of treatment options. Patient does have osteoarthritis of her left knee. If she does not do well she may benefit from arthroscopic cleanout. I will have her follow-up with Dr. Carlos Elise in 4 weeks if need    I discussed with Dary Walsh that her history, symptoms, signs and imaging are most consistent with knee arthritis. We reviewed the natural history of these conditions and treatment options ranging from conservative measures (rest, icing, activity modification, physical therapy, pain meds, cortisone injection) to surgical options. In terms of treatment, I recommended continuing with rest, icing, avoidance of painful activities, NSAIDs or pain meds as tolerated, and physical therapy. If these are not effective, cortisone injection can be considered. We discussed surgical options as well, should conservative measures fail.

## 2021-06-26 ENCOUNTER — HOSPITAL ENCOUNTER (EMERGENCY)
Age: 61
Discharge: HOME | End: 2021-06-26
Attending: EMERGENCY MEDICINE
Payer: MEDICARE

## 2021-06-26 VITALS
WEIGHT: 180 LBS | BODY MASS INDEX: 29.99 KG/M2 | RESPIRATION RATE: 18 BRPM | DIASTOLIC BLOOD PRESSURE: 94 MMHG | HEIGHT: 65 IN | OXYGEN SATURATION: 99 % | TEMPERATURE: 97.6 F | HEART RATE: 72 BPM | SYSTOLIC BLOOD PRESSURE: 136 MMHG
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
## 2021-06-26 PROCEDURE — 99284 EMERGENCY DEPT VISIT MOD MDM: CPT

## 2021-06-26 PROCEDURE — 6370000000 HC RX 637 (ALT 250 FOR IP): Performed by: EMERGENCY MEDICINE

## 2021-06-26 RX ORDER — FLUCONAZOLE 100 MG/1
200 TABLET ORAL ONCE
Status: COMPLETED | OUTPATIENT
Start: 2021-06-26 | End: 2021-06-26

## 2021-06-26 RX ORDER — DOXYCYCLINE HYCLATE 100 MG
200 TABLET ORAL ONCE
Status: COMPLETED | OUTPATIENT
Start: 2021-06-26 | End: 2021-06-26

## 2021-06-26 RX ADMIN — FLUCONAZOLE 200 MG: 100 TABLET ORAL at 14:57

## 2021-06-26 RX ADMIN — DOXYCYCLINE HYCLATE 200 MG: 100 TABLET, FILM COATED ORAL at 14:57

## 2021-06-27 ASSESSMENT — ENCOUNTER SYMPTOMS: BACK PAIN: 0

## 2021-06-27 NOTE — ED PROVIDER NOTES
[facility]        Pt Name: Dominic Finley  MRN: [id number]  [place] 1960  Date of evaluation: 6/26/2021  Provider: Skye Graham MD  PCP: Mikey Suarez MD  ED Attending: No att. providers found    [address] [place] [address]       Chief Complaint   Patient presents with    Tick Removal       HISTORY OF PRESENT ILLNESS   (Location/Symptom, Timing/Onset, Context/Setting, Quality, Duration, Modifying Factors, Severity)  Note limiting factors. Dominic Finley is a 64 y.o. female who presents to the ED after working in the yard a couple of days ago and discovering multiple ticks on herself. There are two that she has not been able to remove and is requesting their removal.  Patient states the sites are somewhat itchy. She otherwise has no complaints. History is obtained from the patient. REVIEW OF SYSTEMS    (2-9 systems for level 4, 10 or more for level 5)     Review of Systems   Constitutional: Negative for chills and fever. Musculoskeletal: Negative for arthralgias, back pain and myalgias. Skin: Positive for wound. Negative for rash. Neurological: Negative for weakness and numbness. Positives and Pertinent negatives as per HPI. Except as noted above in the ROS, all other systems were reviewed and negative.        PAST MEDICAL HISTORY     Past Medical History:   Diagnosis Date    Diabetes mellitus ([address]     Diverticulosis     Hypertension     Influenza A 42/26/6761    Lichen planus     Thyroid disease          SURGICAL HISTORY     Past Surgical History:   Procedure Laterality Date    ABDOMEN SURGERY      APPENDECTOMY      CHOLECYSTECTOMY      HYSTERECTOMY           [address]       Discharge Medication List as of 6/26/2021  2:58 PM      CONTINUE these medications which have NOT CHANGED    Details   metFORMIN (GLUCOPHAGE) 500 MG tablet Take 500 mg by mouth daily (with breakfast)Historical Med      Multiple Vitamin Clubs or Organizations:     Attends Club or Organization Meetings:     Marital Status:    Intimate Partner Violence:     Fear of Current or Ex-Partner:     Emotionally Abused:     Physically Abused:     Sexually Abused:        SCREENINGS             PHYSICAL EXAM    (up to 7 for level 4, 8 or more for level 5)     ED Triage Vitals [06/26/21 1253]   BP Temp Temp Source Pulse Resp SpO2 Height Weight   (!) 136/94 97.6 °F (36.4 °C) Oral 72 18 99 % 5' 5\" (1.651 m) 180 lb (81.6 kg)       Physical Exam  Vitals and nursing note reviewed. Constitutional:       General: She is not in acute distress. Appearance: Normal appearance. She is not ill-appearing or toxic-appearing. HENT:      Head: Normocephalic and atraumatic. Pulmonary:      Effort: Pulmonary effort is normal. No respiratory distress. Musculoskeletal:         General: No swelling or signs of injury. Skin:     General: Skin is warm and dry. Comments: Head to toe, including hair line, examined with two embedded ticks still present. Breast/groin area deferred. Evidence of prior bite marks with minimal erythema and some excoriation present. Neurological:      Mental Status: She is alert. DIAGNOSTIC RESULTS   LABS:    No results found for this visit on 06/26/21. All other labs were within normal range ornot returned as of this dictation. EKG: All EKG's are interpreted by the Emergency Department Physician who either signs or Co-signs this chart in the absence of a cardiologist.  Please see their note for interpretation of EKG.     RADIOLOGY:   Non-plain film images such as CT, Ultrasound and MRI are read by the radiologist.  Plain radiographic images are visualized and preliminarily interpreted by the ED Provider with the belowfindings:    Interpretation per the Radiologist below, if available at the time of this note:    No orders to display         PROCEDURES   Unless otherwise noted below, none     Foreign Body  Performed by: Blas Galeana MD  Authorized by: Blas Galeana MD     Consent:     Consent obtained:  Verbal    Consent given by:  Patient    Risks discussed:  Bleeding, infection, pain and incomplete removal    Alternatives discussed:  No treatment and referral  Location:     Location:  Leg    Leg location:  R knee    Depth: Intradermal    Tendon involvement:  None  Pre-procedure details:     Neurovascular status: intact    Anesthesia (see MAR for exact dosages): Anesthesia method:  None  Procedure type:     Procedure complexity:  Simple  Procedure details:     Dissection of underlying tissues: no      Bloodless field: yes      Removal mechanism: Forceps    Foreign bodies recovered:  1    Description:  Tick grasped with forceps and removed. One mandible had to be seperately removed. Intact foreign body removal: yes    Post-procedure details:     Neurovascular status: intact      Confirmation:  No additional foreign bodies on visualization    Skin closure:  None    Dressing:  Antibiotic ointment    Patient tolerance of procedure: Tolerated well, no immediate complications  Foreign Body  Performed by: Blas Galeana MD  Authorized by: Blas Galeana MD     Consent:     Consent obtained:  Verbal    Consent given by:  Patient    Risks discussed:  Bleeding, infection, pain and incomplete removal    Alternatives discussed:  No treatment and referral  Location:     Location:  Leg    Leg location:  L knee    Depth: Intradermal    Tendon involvement:  None  Pre-procedure details:     Imaging:  None    Neurovascular status: intact    Anesthesia (see MAR for exact dosages): Anesthesia method:  None  Procedure type:     Procedure complexity:  Simple  Procedure details:     Localization method:  Visualized    Dissection of underlying tissues: no      Bloodless field: yes      Removal mechanism:   Forceps    Foreign bodies recovered:  1    Description:  Tick grasped with forceps and removed. Intact foreign body removal: yes    Post-procedure details:     Neurovascular status: intact      Confirmation:  No additional foreign bodies on visualization    Skin closure:  None    Dressing:  Antibiotic ointment    Patient tolerance of procedure: Tolerated well, no immediate complications        CRITICAL CARE TIME   N/A    CONSULTS:  None      EMERGENCY DEPARTMENT COURSE and DIFFERENTIAL DIAGNOSIS/MDM:   Vitals:    Vitals:    06/26/21 1253   BP: (!) 136/94   Pulse: 72   Resp: 18   Temp: 97.6 °F (36.4 °C)   TempSrc: Oral   SpO2: 99%   Weight: 180 lb (81.6 kg)   Height: 5' 5\" (1.651 m)       Patient was given the following medications:  Medications   doxycycline hyclate (VIBRA-TABS) tablet 200 mg (200 mg Oral Given 6/26/21 1457)   fluconazole (DIFLUCAN) tablet 200 mg (200 mg Oral Given 6/26/21 1457)     Patient presents with two ticks embedded in her skin that she has not been able to reach to remove them herself. These were removed as above. No further ticks found. I discussed Lyme prophylaxis with the patient. She has elected to receive it despite the low local occurrence of Lyme disease. Patient has a history of frequent yeast infections with antibiotics, therefore diflucan also administered. Patient instructed on wound care. She is agreeable with the plan. Differential diagnosis included but was not limited to: cellulitis, abscess, necrotizing fasciitis, ulceration, retained foreign body. The patient understands the importance of follow up and reasons to return. FINAL IMPRESSION      1. Tick bite, initial encounter    2. Tick bite with subsequent removal of tick          DISPOSITION/PLAN   DISPOSITION Decision To Discharge 06/26/2021 02:47:35 PM      PATIENT REFERRED TO:  Trevor Pablo [address] [facility]  [phone]    Schedule an appointment as soon as possible for a visit in 1 week      [facility]  [address] [facility] [address] [facility]  [phone]  Go to   If symptoms worsen      DISCHARGE MEDICATIONS:  Discharge Medication List as of 6/26/2021  2:58 PM          DISCONTINUED MEDICATIONS:  Discharge Medication List as of 6/26/2021  2:58 PM                 (Please note that portions of this note were completed with a voice recognition program.  Efforts were made to edit the dictations but occasionally words are mis-transcribed.)    Jessica Reddy MD(electronically signed)              Jessica Reddy MD  06/27/21 0424

## 2021-07-08 ENCOUNTER — TELEPHONE (OUTPATIENT)
Dept: ORTHOPEDIC SURGERY | Age: 61
End: 2021-07-08

## 2021-07-08 ENCOUNTER — APPOINTMENT (OUTPATIENT)
Dept: GENERAL RADIOLOGY | Age: 61
End: 2021-07-08
Payer: MEDICARE

## 2021-07-08 ENCOUNTER — HOSPITAL ENCOUNTER (EMERGENCY)
Age: 61
Discharge: HOME | End: 2021-07-08
Attending: EMERGENCY MEDICINE
Payer: MEDICARE

## 2021-07-08 VITALS
DIASTOLIC BLOOD PRESSURE: 97 MMHG | TEMPERATURE: 96.8 F | HEART RATE: 92 BPM | BODY MASS INDEX: 29.99 KG/M2 | RESPIRATION RATE: 16 BRPM | SYSTOLIC BLOOD PRESSURE: 138 MMHG | WEIGHT: 180 LBS | OXYGEN SATURATION: 98 % | HEIGHT: 65 IN

## 2021-07-08 DIAGNOSIS — S82.891A CLOSED FRACTURE OF RIGHT ANKLE, INITIAL ENCOUNTER: Primary | ICD-10-CM

## 2021-07-08 PROCEDURE — 99283 EMERGENCY DEPT VISIT LOW MDM: CPT

## 2021-07-08 PROCEDURE — 6370000000 HC RX 637 (ALT 250 FOR IP): Performed by: EMERGENCY MEDICINE

## 2021-07-08 PROCEDURE — 73610 X-RAY EXAM OF ANKLE: CPT

## 2021-07-08 RX ORDER — ACETAMINOPHEN 325 MG/1
650 TABLET ORAL ONCE
Status: COMPLETED | OUTPATIENT
Start: 2021-07-08 | End: 2021-07-08

## 2021-07-08 RX ORDER — IBUPROFEN 600 MG/1
600 TABLET ORAL ONCE
Status: COMPLETED | OUTPATIENT
Start: 2021-07-08 | End: 2021-07-08

## 2021-07-08 RX ADMIN — ACETAMINOPHEN 650 MG: 325 TABLET ORAL at 14:32

## 2021-07-08 RX ADMIN — IBUPROFEN 600 MG: 600 TABLET, FILM COATED ORAL at 14:32

## 2021-07-08 ASSESSMENT — ENCOUNTER SYMPTOMS
SHORTNESS OF BREATH: 0
ABDOMINAL PAIN: 0
BACK PAIN: 0

## 2021-07-08 ASSESSMENT — PAIN DESCRIPTION - ORIENTATION: ORIENTATION: RIGHT

## 2021-07-08 ASSESSMENT — PAIN DESCRIPTION - PAIN TYPE: TYPE: ACUTE PAIN

## 2021-07-08 ASSESSMENT — PAIN DESCRIPTION - DESCRIPTORS: DESCRIPTORS: THROBBING

## 2021-07-08 ASSESSMENT — PAIN DESCRIPTION - FREQUENCY: FREQUENCY: CONTINUOUS

## 2021-07-08 ASSESSMENT — PAIN SCALES - GENERAL
PAINLEVEL_OUTOF10: 7
PAINLEVEL_OUTOF10: 7

## 2021-07-08 ASSESSMENT — PAIN DESCRIPTION - LOCATION: LOCATION: ANKLE

## 2021-07-08 NOTE — ED PROVIDER NOTES
[facility]      Pt Name: Stalin Horan  MRN: [id number]  [place] 1960  Date of evaluation: 7/8/2021  Provider: Xiao Bueno MD    [address] [place] [address]       Chief Complaint   Patient presents with    Ankle Pain     Right ankle pain, patient states she twisted her ankle when walking down step. HISTORY OF PRESENT ILLNESS   (Location/Symptom, Timing/Onset, Context/Setting, Quality, Duration, Modifying Factors, Severity)  Note limiting factors. Stalin Horna is a 64 y.o. female who presents to the emergency department     Patient presents acute injury inversion to the right ankle his pain and swelling over the lateral posterior aspect of the distal fibula and over the tip patient is able to ambulate but it is painful for  Pain is about a 5-7 over 10 patient has no other injuries noted this occurred just prior to arrival    The history is provided by the patient. Nursing Notes were reviewed. REVIEW OF SYSTEMS    (2-9 systems for level 4, 10 or more for level 5)     Review of Systems   Constitutional: Negative for chills and fever. Respiratory: Negative for shortness of breath. Cardiovascular: Negative for chest pain. Gastrointestinal: Negative for abdominal pain. Musculoskeletal: Positive for arthralgias. Negative for back pain and neck pain. Neurological: Negative for dizziness. Psychiatric/Behavioral: Negative for behavioral problems. All other systems reviewed and are negative. Except as noted above the remainder of the review of systems was reviewed and negative.        PAST MEDICAL HISTORY     Past Medical History:   Diagnosis Date    Diabetes mellitus ([address]     Diverticulosis     Hypertension     Influenza A 04/74/9830    Lichen planus     Thyroid disease          SURGICAL HISTORY       Past Surgical History:   Procedure Laterality Date    ABDOMEN SURGERY      APPENDECTOMY      CHOLECYSTECTOMY      HYSTERECTOMY           CURRENT MEDICATIONS       Previous Medications    GABAPENTIN (NEURONTIN) 400 MG CAPSULE    Take 400 mg by mouth 3 times daily as needed    LEVOTHYROXINE (SYNTHROID) 150 MCG TABLET    Take 125 mcg by mouth Daily. LISINOPRIL (PRINIVIL;ZESTRIL) 10 MG TABLET    Take 20 mg by mouth daily. METFORMIN (GLUCOPHAGE) 500 MG TABLET    Take 500 mg by mouth daily (with breakfast)    MULTIPLE VITAMIN (MULTIVITAMIN ADULT PO)    Take by mouth    PHENOL (SORE THROAT) 1.4 % AERS    Take 1 spray by mouth every 2 hours as needed (sore throat)    SODIUM CHLORIDE (OCEAN) 0.65 % NASAL SPRAY    1 spray by Nasal route as needed for Congestion       ALLERGIES     Sulfa antibiotics    FAMILY HISTORY     No family history on file. SOCIAL HISTORY       Social History     Socioeconomic History    Marital status:      Spouse name: Not on file    Number of children: Not on file    Years of education: Not on file    Highest education level: Not on file   Occupational History    Not on file   Tobacco Use    Smoking status: Former Smoker    Smokeless tobacco: Never Used   Vaping Use    Vaping Use: Never used   Substance and Sexual Activity    Alcohol use: No    Drug use: No    Sexual activity: Never   Other Topics Concern    Not on file   Social History Narrative    Not on file     Social Determinants of Health     Financial Resource Strain:     Difficulty of Paying Living Expenses:    Food Insecurity:     Worried About [address] in the Last Year:     [address] in the Last Year:    Transportation Needs:     Lack of Transportation (Medical):      Lack of Transportation (Non-Medical):    Physical Activity:     Days of Exercise per Week:     Minutes of Exercise per Session:    Stress:     Feeling of Stress :    Social Connections:     Frequency of Communication with Friends and Family:     Frequency of Social Gatherings with Friends and Family:     Attends Scientologist Services:     Active Member of Clubs or Organizations:     Attends Club or Organization Meetings:     Marital Status:    Intimate Partner Violence:     Fear of Current or Ex-Partner:     Emotionally Abused:     Physically Abused:     Sexually Abused:        SCREENINGS               PHYSICAL EXAM    (up to 7 for level 4, 8 or more for level 5)     ED Triage Vitals [07/08/21 1355]   BP Temp Temp Source Pulse Resp SpO2 Height Weight   (!) 138/97 96.8 °F (36 °C) Oral 92 16 98 % 5' 5\" (1.651 m) 180 lb (81.6 kg)       Physical Exam  Vitals and nursing note reviewed. Constitutional:       General: She is not in acute distress. Appearance: Normal appearance. She is well-developed. She is not diaphoretic. HENT:      Head: Normocephalic. Eyes:      Conjunctiva/sclera: Conjunctivae normal.      Pupils: Pupils are equal, round, and reactive to light. Neck:      Thyroid: No thyromegaly. Cardiovascular:      Rate and Rhythm: Normal rate and regular rhythm. Heart sounds: Normal heart sounds. No murmur heard. No friction rub. No gallop. Pulmonary:      Effort: Pulmonary effort is normal. No respiratory distress. Breath sounds: Normal breath sounds. Abdominal:      General: Bowel sounds are normal. There is no distension. Palpations: Abdomen is soft. Tenderness: There is no abdominal tenderness. Musculoskeletal:         General: Tenderness and signs of injury present. Cervical back: Normal range of motion and neck supple. Right ankle: Tenderness present over the lateral malleolus. No base of 5th metatarsal tenderness. Decreased range of motion. Right Achilles Tendon: Normal.      Left ankle:      Left Achilles Tendon: Normal.   Neurological:      Mental Status: She is alert and oriented to person, place, and time. GCS: GCS eye subscore is 4. GCS verbal subscore is 5. GCS motor subscore is 6. Cranial Nerves: No cranial nerve deficit.       Sensory: No sensory deficit. Motor: No abnormal muscle tone. Coordination: Coordination normal.      Deep Tendon Reflexes: Reflexes normal.   Psychiatric:         Behavior: Behavior normal.         DIAGNOSTIC RESULTS     EKG: All EKG's are interpreted by the Emergency Department Physician who either signs or Co-signs this chart in the absence of a cardiologist.        RADIOLOGY:   Non-plain film images such as CT, Ultrasound and MRI are read by the radiologist. Plain radiographic images are visualized and preliminarily interpreted by the emergency physician with the below findings:        Interpretation per the Radiologist below, if available at the time of this note:    XR ANKLE RIGHT (MIN 3 VIEWS)   Preliminary Result   Acute, nondisplaced cortical avulsion at the tip of the lateral malleolus                 LABS:  No results found for this visit on 07/08/21. EMERGENCY DEPARTMENT COURSE and DIFFERENTIAL DIAGNOSIS/MDM:     Vitals:    07/08/21 1355   BP: (!) 138/97   Pulse: 92   Resp: 16   Temp: 96.8 °F (36 °C)   TempSrc: Oral   SpO2: 98%   Weight: 180 lb (81.6 kg)   Height: 5' 5\" (1.651 m)           MDM      REASSESSMENT          CRITICAL CARE TIME     CONSULTS:  None      PROCEDURES:     Procedures    MEDICATIONS GIVEN THIS VISIT:  Medications   ibuprofen (ADVIL;MOTRIN) tablet 600 mg (600 mg Oral Given 7/8/21 1432)   acetaminophen (TYLENOL) tablet 650 mg (650 mg Oral Given 7/8/21 1432)        FINAL IMPRESSION      1. Closed fracture of right ankle, initial encounter            DISPOSITION/PLAN   DISPOSITION Decision To Discharge 07/08/2021 02:48:53 PM      PATIENT REFERRED TO:  Gali Paige [address] [place] [address]  [phone]    Schedule an appointment as soon as possible for a visit in 3 days        DISCHARGE MEDICATIONS:  New Prescriptions    No medications on file       Controlled Substances Monitoring  No flowsheet data found.         (Please note that portions of this note were completed

## 2021-07-08 NOTE — TELEPHONE ENCOUNTER
Appointment Request     Patient requesting earlier appointment: Yes  Appointment offered to patient: NEXT AVAIL  Patient Contact Number: [phone]    [address] [place] [address],[address]

## 2021-07-08 NOTE — ED NOTES
D/C instructions reviewed with no questions or concern. Patient ambulated off unit in stable condition.       Jarrod Silva RN  07/08/21 0883

## 2021-07-09 ENCOUNTER — TELEPHONE (OUTPATIENT)
Dept: ORTHOPEDIC SURGERY | Age: 61
End: 2021-07-09

## 2022-04-18 ENCOUNTER — OFFICE VISIT (OUTPATIENT)
Dept: ORTHOPEDIC SURGERY | Age: 62
End: 2022-04-18
Payer: MEDICARE

## 2022-04-18 ENCOUNTER — HOSPITAL ENCOUNTER (OUTPATIENT)
Age: 62
Discharge: HOME | End: 2022-04-18
Payer: MEDICARE

## 2022-04-18 ENCOUNTER — HOSPITAL ENCOUNTER (EMERGENCY)
Age: 62
Discharge: HOME | End: 2022-04-18
Payer: MEDICARE

## 2022-04-18 VITALS — HEIGHT: 65 IN | BODY MASS INDEX: 30.32 KG/M2 | RESPIRATION RATE: 16 BRPM | WEIGHT: 182 LBS

## 2022-04-18 VITALS
HEIGHT: 65 IN | RESPIRATION RATE: 18 BRPM | OXYGEN SATURATION: 98 % | HEART RATE: 76 BPM | TEMPERATURE: 98.1 F | DIASTOLIC BLOOD PRESSURE: 99 MMHG | WEIGHT: 182 LBS | SYSTOLIC BLOOD PRESSURE: 144 MMHG | BODY MASS INDEX: 30.32 KG/M2

## 2022-04-18 DIAGNOSIS — H92.03 OTALGIA OF BOTH EARS: ICD-10-CM

## 2022-04-18 DIAGNOSIS — G89.29 CHRONIC PAIN OF RIGHT KNEE: ICD-10-CM

## 2022-04-18 DIAGNOSIS — M25.561 CHRONIC PAIN OF RIGHT KNEE: ICD-10-CM

## 2022-04-18 DIAGNOSIS — H61.23 BILATERAL IMPACTED CERUMEN: Primary | ICD-10-CM

## 2022-04-18 DIAGNOSIS — Z01.818 PRE-OPERATIVE EXAMINATION: Primary | ICD-10-CM

## 2022-04-18 DIAGNOSIS — Z01.818 PRE-OPERATIVE EXAMINATION: ICD-10-CM

## 2022-04-18 LAB
ABO/RH: NORMAL
ANTIBODY SCREEN: NORMAL
APTT: 33.6 SEC (ref 26.2–38.6)
BILIRUBIN URINE: NEGATIVE
BLOOD, URINE: NEGATIVE
CLARITY: CLEAR
COLOR: YELLOW
GLUCOSE URINE: NEGATIVE MG/DL
INR BLD: 1.03 (ref 0.88–1.12)
KETONES, URINE: NEGATIVE MG/DL
LEUKOCYTE ESTERASE, URINE: NEGATIVE
MICROSCOPIC EXAMINATION: NORMAL
NITRITE, URINE: NEGATIVE
PH UA: 5 (ref 5–8)
PROTEIN UA: NEGATIVE MG/DL
PROTHROMBIN TIME: 11.6 SEC (ref 9.9–12.7)
SPECIFIC GRAVITY UA: >=1.03 (ref 1–1.03)
URINE TYPE: NORMAL
UROBILINOGEN, URINE: 0.2 E.U./DL

## 2022-04-18 PROCEDURE — 69210 REMOVE IMPACTED EAR WAX UNI: CPT

## 2022-04-18 PROCEDURE — 87086 URINE CULTURE/COLONY COUNT: CPT

## 2022-04-18 PROCEDURE — 81003 URINALYSIS AUTO W/O SCOPE: CPT

## 2022-04-18 PROCEDURE — 99283 EMERGENCY DEPT VISIT LOW MDM: CPT

## 2022-04-18 PROCEDURE — 85610 PROTHROMBIN TIME: CPT

## 2022-04-18 PROCEDURE — 83036 HEMOGLOBIN GLYCOSYLATED A1C: CPT

## 2022-04-18 PROCEDURE — 85730 THROMBOPLASTIN TIME PARTIAL: CPT

## 2022-04-18 PROCEDURE — 85025 COMPLETE CBC W/AUTO DIFF WBC: CPT

## 2022-04-18 PROCEDURE — 86850 RBC ANTIBODY SCREEN: CPT

## 2022-04-18 PROCEDURE — 93005 ELECTROCARDIOGRAM TRACING: CPT | Performed by: PHYSICIAN ASSISTANT

## 2022-04-18 PROCEDURE — 86901 BLOOD TYPING SEROLOGIC RH(D): CPT

## 2022-04-18 PROCEDURE — 82040 ASSAY OF SERUM ALBUMIN: CPT

## 2022-04-18 PROCEDURE — 36415 COLL VENOUS BLD VENIPUNCTURE: CPT

## 2022-04-18 PROCEDURE — 86900 BLOOD TYPING SEROLOGIC ABO: CPT

## 2022-04-18 PROCEDURE — 80048 BASIC METABOLIC PNL TOTAL CA: CPT

## 2022-04-18 PROCEDURE — 87641 MR-STAPH DNA AMP PROBE: CPT

## 2022-04-18 PROCEDURE — 99214 OFFICE O/P EST MOD 30 MIN: CPT | Performed by: PHYSICIAN ASSISTANT

## 2022-04-18 PROCEDURE — 84466 ASSAY OF TRANSFERRIN: CPT

## 2022-04-18 RX ORDER — AMOXICILLIN AND CLAVULANATE POTASSIUM 875; 125 MG/1; MG/1
1 TABLET, FILM COATED ORAL ONCE
Status: DISCONTINUED | OUTPATIENT
Start: 2022-04-18 | End: 2022-04-18 | Stop reason: HOSPADM

## 2022-04-18 RX ORDER — AMOXICILLIN AND CLAVULANATE POTASSIUM 875; 125 MG/1; MG/1
1 TABLET, FILM COATED ORAL 2 TIMES DAILY
Qty: 20 TABLET | Refills: 0 | Status: SHIPPED | OUTPATIENT
Start: 2022-04-18 | End: 2022-04-28

## 2022-04-18 ASSESSMENT — PAIN DESCRIPTION - PAIN TYPE: TYPE: ACUTE PAIN

## 2022-04-18 ASSESSMENT — PAIN - FUNCTIONAL ASSESSMENT
PAIN_FUNCTIONAL_ASSESSMENT: 0-10
PAIN_FUNCTIONAL_ASSESSMENT: 0-10

## 2022-04-18 ASSESSMENT — PAIN DESCRIPTION - ORIENTATION: ORIENTATION: LEFT;RIGHT

## 2022-04-18 ASSESSMENT — PAIN DESCRIPTION - LOCATION: LOCATION: EAR

## 2022-04-18 ASSESSMENT — PAIN SCALES - GENERAL: PAINLEVEL_OUTOF10: 5

## 2022-04-18 NOTE — ED PROVIDER NOTES
Evaluated by Advanced Practice Provider    [facility]  ED    CHIEF COMPLAINT  Otalgia (b/l ear pain started a couple months ago)    HISTORY OF PRESENT ILLNESS  Yenifer Webster is a 63 y.o. female who presents to the ED with complaints of bilateral ear pain for a couple of months. Reports she thinks they need cleaned again, she has been here in the past to have her ears cleaned when they hurt. Denies other complaints at this time. The patient is currently rating their pain as 5/10 and describes it as an aching type of pain. The patient arrived to the ED via private car. Nursing notes reviewed. Past Medical History:   Diagnosis Date    Diabetes mellitus ([address]     Diverticulosis     Hypertension     Influenza A 93/39/0481    Lichen planus     Thyroid disease      Past Surgical History:   Procedure Laterality Date    ABDOMEN SURGERY      APPENDECTOMY      CHOLECYSTECTOMY      HYSTERECTOMY       History reviewed. No pertinent family history. Social History     Socioeconomic History    Marital status:      Spouse name: Not on file    Number of children: Not on file    Years of education: Not on file    Highest education level: Not on file   Occupational History    Not on file   Tobacco Use    Smoking status: Former Smoker    Smokeless tobacco: Never Used   Vaping Use    Vaping Use: Never used   Substance and Sexual Activity    Alcohol use: No    Drug use: No    Sexual activity: Never   Other Topics Concern    Not on file   Social History Narrative    Not on file     Social Determinants of Health     Financial Resource Strain:     Difficulty of Paying Living Expenses: Not on file   Food Insecurity:     Worried About [address] in the Last Year: Not on file    Adilia of Food in the Last Year: Not on file   Transportation Needs:     Lack of Transportation (Medical): Not on file    Lack of Transportation (Non-Medical):  Not on file   Physical Activity:     Days of Exercise per Week: Not on file    Minutes of Exercise per Session: Not on file   Stress:     Feeling of Stress : Not on file   Social Connections:     Frequency of Communication with Friends and Family: Not on file    Frequency of Social Gatherings with Friends and Family: Not on file    Attends Protestant Services: Not on file    Active Member of [address] [place] [address] or Organizations: Not on file    Attends Club or Organization Meetings: Not on file    Marital Status: Not on file   Intimate Partner Violence:     Fear of Current or Ex-Partner: Not on file    Emotionally Abused: Not on file    Physically Abused: Not on file    Sexually Abused: Not on file   Housing Stability:     Unable to Pay for Housing in the Last Year: Not on file    Number of [place] in the Last Year: Not on file    Unstable Housing in the Last Year: Not on file     Current Facility-Administered Medications   Medication Dose Route Frequency Provider Last Rate Last Admin    amoxicillin-clavulanate (AUGMENTIN) 875-125 MG per tablet 1 tablet  1 tablet Oral Once ASHLEY Chamberlain - CNP         Current Outpatient Medications   Medication Sig Dispense Refill    amoxicillin-clavulanate (AUGMENTIN) 875-125 MG per tablet Take 1 tablet by mouth 2 times daily for 10 days 20 tablet 0    carbamide peroxide (DEBROX) 6.5 % otic solution Place 5 drops into both ears 2 times daily 15 mL 0    diclofenac sodium (VOLTAREN) 1 % GEL Apply 2 g topically 2 times daily 50 g 0    metFORMIN (GLUCOPHAGE) 500 MG tablet Take 500 mg by mouth daily (with breakfast)      Multiple Vitamin (MULTIVITAMIN ADULT PO) Take by mouth (Patient not taking: Reported on 4/18/2022)      gabapentin (NEURONTIN) 400 MG capsule Take 400 mg by mouth 3 times daily as needed (Patient not taking: Reported on 4/18/2022)      phenol (SORE THROAT) 1.4 % AERS Take 1 spray by mouth every 2 hours as needed (sore throat) (Patient not taking: Reported on 7/15/2019) 117 mL 0    sodium chloride (OCEAN) 0.65 % nasal spray 1 spray by Nasal route as needed for Congestion (Patient not taking: Reported on 4/18/2022) 1 Bottle 3    lisinopril (PRINIVIL;ZESTRIL) 10 MG tablet Take 20 mg by mouth daily.  levothyroxine (SYNTHROID) 150 MCG tablet Take 125 mcg by mouth Daily. Allergies   Allergen Reactions    Sulfa Antibiotics          REVIEW OF SYSTEMS    6 systems reviewed, pertinent positives per HPI otherwise noted to be negative      PHYSICAL EXAM  BP (!) 144/99   Pulse 76   Temp 98.1 °F (36.7 °C)   Resp 18   Ht 5' 5\" (1.651 m)   Wt 182 lb (82.6 kg)   SpO2 98%   BMI 30.29 kg/m²     GENERAL APPEARANCE: Awake and alert. Cooperative. No acute distress. HEAD: Normocephalic. Atraumatic. EYES: Lids normal, conjunctiva normal, sclera non-icteric. ENT: Mucous membranes are moist.  Examination of left ear reveals normal external ear, no erythema, no edema, no bruising. Examination of the ear canal reveals it to be completely obstructed with cerumen. Examination of right ear reveals normal external ear, no erythema, no edema, no bruising. Examination of the ear canal reveals it to be completely obstructed by cerumen. Nose is normal and without rhinorrhea. NECK: Normal ROM. Trachea is midline. CHEST: Equal symmetric chest rise. Regular rate and rhythm. LUNGS: Breathing is unlabored. Speaking comfortably in full sentences. Abdomen: Non-distended. EXTREMITIES: MAEE. No acute deformities. SKIN: Warm and dry. NEUROLOGICAL: Alert and oriented. LABS  No results found for this visit on 04/18/22. RADIOLOGY  None    ED COURSE/MDM  Patient seen and evaluated. Old records reviewed. I have evaluated this patient. My supervising physician was available for consultation. Betty Springer presented to the ED today with above noted complaints. On physical exam findings are consistent with bilateral cerumen impaction.  Patient having a significant amount of pain even on initial assessment with ear speculum. I attempted to flush the left ear with warm water and H2O2 which patient had a good deal of pain with, even when I was barely in the ear canal. I did attempt to use an ear curette and was able to remove a small portion of the cerumen but this was extremely painful and she could not tolerate this, she was yelling and grabbing my hands. Left ear canal remains impacted with cerumen. I did not attempt irrigation or removal to the right ear. I am referring here to ENT for further evaluation and management. I have ordered augmentin prophylaxis for infection. I have also ordered debrox and adivsed to use until she is seen by ENT. Pt was given the following medications or treatments in the ED:   Medications   amoxicillin-clavulanate (AUGMENTIN) 875-125 MG per tablet 1 tablet (1 tablet Oral Not Given 4/18/22 1946)       At this point I do not feel the patient requires further work up and it is reasonable to discharge the patient. Please refer to AVS for further details regarding discharge instructions. A discussion was had with the patient regarding diagnosis, treatment/ plan of care, and follow up. All questions were answered. Patient will return to ED for new/worsening symptoms such as fever, vomiting, difficulty opening his mouth, or swallowing. Patient will follow up as directed for further evaluation/treatment. The patient was given strict return precautions as we discussed symptoms that would necessitate return to the ED. Patient will return to ED for new/worsening symptoms. The patient verbalized their understanding and agreement with the above plan. I estimate there is LOW risk for EPIGLOTTITIS, PNEUMONIA, MENINGITIS, OR URINARY TRACT INFECTION, thus I consider the discharge disposition reasonable. Also, there is no evidence or peritonitis, sepsis, or toxicity.  Tiffanie B [address] and I have discussed the diagnosis and risks, and we agree with discharging home to follow-up with their primary doctor. We also discussed returning to the Emergency Department immediately if new or worsening symptoms occur. We have discussed the symptoms which are most concerning (e.g., changing or worsening pain, trouble swallowing or breating, neck stiffness, fever) that necessitate immediate return. Clinical Impression    1. Bilateral impacted cerumen    2. Otalgia of both ears        Discharge Vital Signs:  Blood pressure (!) 144/99, pulse 76, temperature 98.1 °F (36.7 °C), resp. rate 18, height 5' 5\" (1.651 m), weight 182 lb (82.6 kg), SpO2 98 %. Patient was sent home with a prescription for below medication/s. I did [place] patient on appropriate use of these medication. Discharge Medication List as of 4/18/2022  7:41 PM      START taking these medications    Details   amoxicillin-clavulanate (AUGMENTIN) 875-125 MG per tablet Take 1 tablet by mouth 2 times daily for 10 days, Disp-20 tablet, R-0Normal      carbamide peroxide (DEBROX) 6.5 % otic solution Place 5 drops into both ears 2 times daily, Disp-15 mL, R-0Normal             FOLLOW UP  Art Smith   [phone]    Call   As needed    Toni Marinelli MD  [facility] [address]    Call in 1 day  For further evaluation-ENT for ear pain    [facility]  ED  [facility] [address]  Go to   If symptoms worsen      DISPOSITION  Patient was discharged to home in good condition. Comment: Please note this report has been produced using speech recognition software and may contain errors related to that system including errors in grammar, punctuation, and spelling, as well as words and phrases that may be inappropriate. If there are any questions or concerns please feel free to contact the dictating provider for clarification.             ASHLEY Vaqsuez - JOSSELYN  04/18/22 2024

## 2022-04-18 NOTE — PROGRESS NOTES
Chief Complaint    Knee Pain (Right - feels like it pops out of place)      History of Present Illness:  Tian Meeks is a 63 y.o. female patient was seen approximately a month ago by Dr. William Swift. She did receive an intra-articular cortisone injection for advancing arthritis. The injection has helped but only minimally. She is here wishing to discuss total knee arthroplasty. Previous history: Patient is here for follow-up visit. The cortisone injection which was given to her at the last visit did help her. She is still questioning about a new brace. I have told her that we already gave her a brace and her insurance company will not pay for another one. Previous history: Patient presents to the office today for a follow-up visit. Patient is here with a chief complaint of right lateral knee pain. Around 2/18/2021 she did suffer an injury. She describes a hyperextension injury. Her pain is concentrated laterally. She was referred to us by her primary care physician for orthopedic care. No past medical history contributing to the region. At her last visit we did order her an MRI. MRI to evaluate for meniscal pathology along with possible microtrabecular fracture. Patient is here for MRI results. She does state that the brace that we gave her last time was helpful for her. She denies any locking or instability issues.      ]    Medical History:  Past Medical History:   Diagnosis Date    Diabetes mellitus ([address]     Diverticulosis     Hypertension     Influenza A 37/12/5474    Lichen planus     Thyroid disease      There are no problems to display for this patient. Past Surgical History:   Procedure Laterality Date    ABDOMEN SURGERY      APPENDECTOMY      CHOLECYSTECTOMY      HYSTERECTOMY       No family history on file.   Social History     Socioeconomic History    Marital status:      Spouse name: Not on file    Number of children: Not on file    Years of education: Not on file    Highest education level: Not on file   Occupational History    Not on file   Tobacco Use    Smoking status: Former Smoker    Smokeless tobacco: Never Used   Vaping Use    Vaping Use: Never used   Substance and Sexual Activity    Alcohol use: No    Drug use: No    Sexual activity: Never   Other Topics Concern    Not on file   Social History Narrative    Not on file     Social Determinants of Health     Financial Resource Strain:     Difficulty of Paying Living Expenses: Not on file   Food Insecurity:     Worried About Running Out of Food in the Last Year: Not on file    Adilia of Food in the Last Year: Not on file   Transportation Needs:     Lack of Transportation (Medical): Not on file    Lack of Transportation (Non-Medical):  Not on file   Physical Activity:     Days of Exercise per Week: Not on file    Minutes of Exercise per Session: Not on file   Stress:     Feeling of Stress : Not on file   Social Connections:     Frequency of Communication with Friends and Family: Not on file    Frequency of Social Gatherings with Friends and Family: Not on file    Attends Cheondo[employer] Services: Not on file    Active Member of [address] [place] [address] or Organizations: Not on file    Attends Club or Organization Meetings: Not on file    Marital Status: Not on file   Intimate Partner Violence:     Fear of Current or Ex-Partner: Not on file    Emotionally Abused: Not on file    Physically Abused: Not on file    Sexually Abused: Not on file   Housing Stability:     Unable to Pay for Housing in the Last Year: Not on file    Number of [place] in the Last Year: Not on file    Unstable Housing in the Last Year: Not on file     Current Outpatient Medications   Medication Sig Dispense Refill    diclofenac sodium (VOLTAREN) 1 % GEL Apply 2 g topically 2 times daily 50 g 0    metFORMIN (GLUCOPHAGE) 500 MG tablet Take 500 mg by mouth daily (with breakfast)      Multiple Vitamin (MULTIVITAMIN ADULT PO) Take by mouth      gabapentin (NEURONTIN) 400 MG capsule Take 400 mg by mouth 3 times daily as needed      phenol (SORE THROAT) 1.4 % AERS Take 1 spray by mouth every 2 hours as needed (sore throat) (Patient not taking: Reported on 7/15/2019) 117 mL 0    sodium chloride (OCEAN) 0.65 % nasal spray 1 spray by Nasal route as needed for Congestion 1 Bottle 3    lisinopril (PRINIVIL;ZESTRIL) 10 MG tablet Take 20 mg by mouth daily.  levothyroxine (SYNTHROID) 150 MCG tablet Take 125 mcg by mouth Daily. No current facility-administered medications for this visit. Review of Systems:  Relevant review of systems reviewed and available in the patient's chart    Vital Signs: There were no vitals filed for this visit. General Exam:   Constitutional: Patient is adequately groomed with no evidence of malnutrition  DTRs: Deep tendon reflexes are intact  Mental Status: The patient is oriented to time, place and person. The patient's mood and affect are appropriate. Lymphatic: The lymphatic examination bilaterally reveals all areas to be without enlargement or induration. Vascular: Examination reveals no swelling or calf tenderness. Peripheral pulses are palpable and 2+. Neurological: The patient has good coordination. There is no weakness or sensory deficit. There is no height or weight on file to calculate BMI. Right knee Examination:    Inspection:  No erythema or signs of infection. There are no cutaneous lesions    Palpation:  There is tenderness to palpation along the lateral joint line. Range of Motion:  0 extension to 120 of active flexion. Strength:  4+/5 quadriceps and hamstrings    Special Tests: The knee is stable to varus valgus stressing/anterior posterior drawer. Negative Homans test.                                 Skin: There are no rashes, ulcerations or lesions.     Gait: mildly antalgic favoring the left side    Reflex 2+ patellar    Examination of the left knee reveals intact skin. There is no focal tenderness. The patient demonstrates full painless range of motion with regard to flexion and extension. Strength is 5/5 throughout all planes. Ligamentous stability is grossly intact. Examination of the right and left hip reveals intact skin. The patient demonstrates full painless range of motion with regards to flexion, abduction, internal and external rotation. There is no tenderness about the greater trochanter. There is a negative straight leg raise against resistance. Strength is 5/5 throughout all planes. Radiology:       X-rays were ordered today reviewed of the right knee. 4 views. Standing AP, standing AP flexed, lateral, and skyline views. They demonstrate advanced lateral compartment osteoarthritis with mild to moderate changes patellofemoral joint no evidence of fractures or dislocations. Previous MRI in March 2021 does demonstrate at least moderate patellofemoral chondromalacia and more advanced medial compartment osteoarthritis with degenerative meniscal signal.    Impression:  Encounter Diagnosis   Name Primary?  Chronic pain of right knee Yes       Office Procedures:  No orders of the defined types were placed in this encounter. Treatment Plan:  I discussed with the patient the nature of osteoarthritis of the knee. We talked about treatment of arthritis and the various options that are involved with this. The patient understands that the treatments can vary from essentially doing nothing to a total joint replacement arthroplasty for arthritis. I then went on to describe the utilization of glucosamine and chondroitin sulfate as a joint nutrition product. We talked about the fact that this is essentially a joint vitamin with typically minimal side effects. We also talked about utilization of prescription over-the-counter anti-inflammatory medications as the next option.  We also discussed the possibility of brace wear or orthotic wear if the patient has significant varus alignment. We then went on to discuss the possibility of [place] supplementation with hyaluronate products. We talked about the typical course of this type of treatment and the fact that often times in the treatment for significant arthritis, this is successful less than half the time. We also talked about the corticosteroid injections and the fact that this can give a brief window of relief, but does not cure the problem; in fact, the pain often has a rebound effect in 6-10 weeks after the steroid has worn off. We also discussed arthroscopy surgery in attempts to debride the joint, but the fact that this is relatively unreliable treatment in the face of significant arthritis. It can occasionally be used, particularly if there is significant meniscus pathology. Lastly we discussed total joint replacement arthroplasty as the final and definitive step in treatment of arthritis. Patient realizes the magnitude of this type of treatment as well as having voiced a general understanding to the duration of the prosthesis. The patient voiced understanding to these continuum of treatment options. Based on x-rays patient may be a candidate for a unicompartment arthroplasty. After reviewing her MRI from a year ago I do believe she has at least moderate chondromalacia patellofemoral joint. For this fact I believe she is a better candidate for total knee arthroplasty. I will have her perform her preoperative lab work and follow-up next week with Dr. Mandy Padilla to hopefully schedule total knee arthroplasty. I discussed with Naren Niteshbing that her history, symptoms, signs and imaging are most consistent with knee arthritis. We reviewed the natural history of these conditions and treatment options ranging from conservative measures (rest, icing, activity modification, physical therapy, pain meds, cortisone injection) to surgical options.        In terms of treatment, I recommended continuing with rest, icing, avoidance of painful activities, NSAIDs or pain meds as tolerated, and physical therapy. If these are not effective, cortisone injection can be considered. We discussed surgical options as well, should conservative measures fail.

## 2022-04-19 LAB
ALBUMIN SERPL-MCNC: 4 G/DL (ref 3.4–5)
ANION GAP SERPL CALCULATED.3IONS-SCNC: 14 MMOL/L (ref 3–16)
BASOPHILS ABSOLUTE: 0.1 K/UL (ref 0–0.2)
BASOPHILS RELATIVE PERCENT: 0.7 %
BUN BLDV-MCNC: 8 MG/DL (ref 7–20)
CALCIUM SERPL-MCNC: 9.8 MG/DL (ref 8.3–10.6)
CHLORIDE BLD-SCNC: 101 MMOL/L (ref 99–110)
CO2: 24 MMOL/L (ref 21–32)
CREAT SERPL-MCNC: 0.8 MG/DL (ref 0.6–1.1)
EKG ATRIAL RATE: 75 BPM
EKG DIAGNOSIS: NORMAL
EKG P AXIS: 30 DEGREES
EKG P-R INTERVAL: 120 MS
EKG Q-T INTERVAL: 372 MS
EKG QRS DURATION: 74 MS
EKG QTC CALCULATION (BAZETT): 415 MS
EKG R AXIS: 31 DEGREES
EKG T AXIS: 39 DEGREES
EKG VENTRICULAR RATE: 75 BPM
EOSINOPHILS ABSOLUTE: 0.1 K/UL (ref 0–0.6)
EOSINOPHILS RELATIVE PERCENT: 1.3 %
ESTIMATED AVERAGE GLUCOSE: 220.2 MG/DL
GFR AFRICAN AMERICAN: >60
GFR NON-AFRICAN AMERICAN: >60
GLUCOSE BLD-MCNC: 186 MG/DL (ref 70–99)
HBA1C MFR BLD: 9.3 %
HCT VFR BLD CALC: 48.1 % (ref 36–48)
HEMOGLOBIN: 15.6 G/DL (ref 12–16)
LYMPHOCYTES ABSOLUTE: 2.7 K/UL (ref 1–5.1)
LYMPHOCYTES RELATIVE PERCENT: 34.3 %
MCH RBC QN AUTO: 26.7 PG (ref 26–34)
MCHC RBC AUTO-ENTMCNC: 32.4 G/DL (ref 31–36)
MCV RBC AUTO: 82.6 FL (ref 80–100)
MONOCYTES ABSOLUTE: 0.6 K/UL (ref 0–1.3)
MONOCYTES RELATIVE PERCENT: 7.4 %
MRSA SCREEN RT-PCR: NORMAL
NEUTROPHILS ABSOLUTE: 4.4 K/UL (ref 1.7–7.7)
NEUTROPHILS RELATIVE PERCENT: 56.3 %
PDW BLD-RTO: 15.6 % (ref 12.4–15.4)
PLATELET # BLD: 329 K/UL (ref 135–450)
PMV BLD AUTO: 7.9 FL (ref 5–10.5)
POTASSIUM SERPL-SCNC: 4.2 MMOL/L (ref 3.5–5.1)
RBC # BLD: 5.82 M/UL (ref 4–5.2)
SODIUM BLD-SCNC: 139 MMOL/L (ref 136–145)
TRANSFERRIN: 296 MG/DL (ref 200–360)
URINE CULTURE, ROUTINE: NORMAL
WBC # BLD: 7.9 K/UL (ref 4–11)

## 2022-04-19 PROCEDURE — 93010 ELECTROCARDIOGRAM REPORT: CPT | Performed by: INTERNAL MEDICINE

## 2022-04-26 ENCOUNTER — TELEPHONE (OUTPATIENT)
Dept: ENT CLINIC | Age: 62
End: 2022-04-26

## 2022-04-26 ENCOUNTER — OFFICE VISIT (OUTPATIENT)
Dept: ENT CLINIC | Age: 62
End: 2022-04-26
Payer: MEDICARE

## 2022-04-26 VITALS
BODY MASS INDEX: 30.22 KG/M2 | SYSTOLIC BLOOD PRESSURE: 132 MMHG | HEIGHT: 65 IN | TEMPERATURE: 97.9 F | HEART RATE: 80 BPM | DIASTOLIC BLOOD PRESSURE: 89 MMHG | WEIGHT: 181.4 LBS

## 2022-04-26 DIAGNOSIS — H92.02 LEFT EAR PAIN: Primary | ICD-10-CM

## 2022-04-26 DIAGNOSIS — H93.8X2 EAR FULLNESS, LEFT: ICD-10-CM

## 2022-04-26 DIAGNOSIS — H61.23 BILATERAL IMPACTED CERUMEN: ICD-10-CM

## 2022-04-26 PROCEDURE — 99203 OFFICE O/P NEW LOW 30 MIN: CPT | Performed by: OTOLARYNGOLOGY

## 2022-04-26 PROCEDURE — 69210 REMOVE IMPACTED EAR WAX UNI: CPT | Performed by: OTOLARYNGOLOGY

## 2022-04-26 RX ORDER — CIPROFLOXACIN AND DEXAMETHASONE 3; 1 MG/ML; MG/ML
4 SUSPENSION/ DROPS AURICULAR (OTIC) 2 TIMES DAILY
Qty: 7.5 ML | Refills: 0 | Status: SHIPPED | OUTPATIENT
Start: 2022-04-26 | End: 2022-05-01

## 2022-04-26 ASSESSMENT — ENCOUNTER SYMPTOMS
SHORTNESS OF BREATH: 0
ABDOMINAL PAIN: 0
WHEEZING: 0

## 2022-04-26 NOTE — PROGRESS NOTES
Oksana Wilson [address], [address], [address] [place] [address]  P: [phone]       Patient     Raman Ly  1960    Chief Concern     Chief Complaint   Patient presents with    Ear Problem     Patient states she is having bilateral ear pian and fullness. Patient states her left ear is hard to hear out of. She is terrified being here. She has had a doctor try to clen her ear and she states she cut her and she grabbed her wrist. She states she does not like anything stuck in her ear besides a suction. Assessment and Plan      Diagnosis Orders   1. Left ear pain  ciprofloxacin-dexamethasone (CIPRODEX) 0.3-0.1 % otic suspension   2. Ear fullness, left     3. Bilateral impacted cerumen       Patient with left ear pain localized to the ear canal (anteriorly, and the temporomandibular joint) after attempts to remove her cerumen in the emergency department were unsuccessful. We removed bilateral cerumen from the ears today, and have counseled her to use Ciprodex drops to the left ear for 4-5 days. She continues to have left ear fullness, and we will reevaluate this in the clinic in 2 weeks and obtain an audiogram.    Return in about 2 weeks (around 5/10/2022). History of Present Illness     Bilateral otalgia and ear fullness since 4/18/2022 when they attempted to remove cerumen. She is concerned that she may have cat litter in her left ear, denies any otorrhea, but significant 10/10 sharp otalgia in the left ear. Also concern for hearing loss although she is uncertain if she can hear well. No vertigo/disequilibrium, no history of chronic middle ear disease.     Past Medical History     Past Medical History:   Diagnosis Date    Diabetes mellitus ([address]     Diverticulosis     Hypertension     Influenza A 20/66/2382    Lichen planus     Thyroid disease        Past Surgical History     Past Surgical History:   Procedure Laterality Date    ABDOMEN SURGERY      APPENDECTOMY      CHOLECYSTECTOMY      HYSTERECTOMY         Family History     No family history on file. Social History     Social History     Tobacco Use    Smoking status: Former Smoker    Smokeless tobacco: Never Used   Vaping Use    Vaping Use: Never used   Substance Use Topics    Alcohol use: No    Drug use: No        Allergies     Allergies   Allergen Reactions    Sulfa Antibiotics        Medications     Current Outpatient Medications   Medication Sig Dispense Refill    ciprofloxacin-dexamethasone (CIPRODEX) 0.3-0.1 % otic suspension Place 4 drops into the left ear 2 times daily for 5 days 7.5 mL 0    amoxicillin-clavulanate (AUGMENTIN) 875-125 MG per tablet Take 1 tablet by mouth 2 times daily for 10 days 20 tablet 0    diclofenac sodium (VOLTAREN) 1 % GEL Apply 2 g topically 2 times daily 50 g 0    metFORMIN (GLUCOPHAGE) 500 MG tablet Take 500 mg by mouth daily (with breakfast)      Multiple Vitamin (MULTIVITAMIN ADULT PO) Take by mouth       gabapentin (NEURONTIN) 400 MG capsule Take 400 mg by mouth 3 times daily as needed.  phenol (SORE THROAT) 1.4 % AERS Take 1 spray by mouth every 2 hours as needed (sore throat) 117 mL 0    sodium chloride (OCEAN) 0.65 % nasal spray 1 spray by Nasal route as needed for Congestion 1 Bottle 3    lisinopril (PRINIVIL;ZESTRIL) 10 MG tablet Take 20 mg by mouth daily.  levothyroxine (SYNTHROID) 150 MCG tablet Take 125 mcg by mouth Daily. No current facility-administered medications for this visit. Review of Systems     Review of Systems   Constitutional: Negative for activity change, chills, diaphoresis, fatigue and fever. HENT: Positive for ear pain and hearing loss. Negative for congestion and tinnitus. Eyes: Negative for visual disturbance. Respiratory: Negative for shortness of breath and wheezing. Cardiovascular: Negative for chest pain. Gastrointestinal: Negative for abdominal pain.    Musculoskeletal: Negative for neck pain and neck stiffness. Skin: Negative for rash. Neurological: Negative for dizziness, light-headedness and headaches. Hematological: Negative for adenopathy. PhysicalExam     Vitals:    04/26/22 1032   BP: 132/89   Site: Left Wrist   Position: Sitting   Pulse: 80   Temp: 97.9 °F (36.6 °C)   TempSrc: Infrared   Weight: 181 lb 6.4 oz (82.3 kg)   Height: 5' 5\" (1.651 m)       Physical Exam  Vitals reviewed. Constitutional:       Appearance: Normal appearance. HENT:      Head: Normocephalic and atraumatic. Right Ear: Tympanic membrane, ear canal and external ear normal. There is impacted cerumen. Left Ear: Ear canal and external ear normal. There is impacted cerumen. Ears:      Agarwal exam findings: does not lateralize. Right Rinne: AC > BC. Left Rinne: AC > BC. Nose: No congestion or rhinorrhea. Mouth/Throat:      Lips: Pink. No lesions. Mouth: Mucous membranes are moist. No oral lesions. Tongue: No lesions. Tongue does not deviate from midline. Palate: No mass and lesions. Pharynx: Oropharynx is clear. Uvula midline. No oropharyngeal exudate or posterior oropharyngeal erythema. Eyes:      Extraocular Movements: Extraocular movements intact. Pupils: Pupils are equal, round, and reactive to light. Musculoskeletal:      Cervical back: Normal range of motion and neck supple. Lymphadenopathy:      Cervical: No cervical adenopathy. Neurological:      Mental Status: She is alert. Cranial Nerves: No cranial nerve deficit. Data Review        Procedure     Cerumen Debridement    Pre op: Cerumen impaction of the Bilateral ears. Post op: Normal ear examination some cerumen left over the superior left tympanic membrane  Procedure : Binocular otomicroscopy with debridement of cerumen  Surgeon: CED Barbosa  Estimated Blood Loss: None    Procedure:   Binocular otomicroscopy with debridement of cerumen impaction    After obtaining consent, the patient was placed in the examination chair in the reclined position.      -Right ear: External auditory canal with mild stenosis, canal skin with occluding cerumen, removed with suction. Right tympanic membrane visible without without significant retractions or cholesteatoma identified, no middle ear effusions.   -Left ear: External auditory canal with mild stenosis, canal skin with occluding cerumen, removed with suction. Left tympanic membrane visible without without significant retractions or cholesteatoma identified, no middle ear effusions. * Patient tolerated the procedure well with no complications    Cresencio MD Prasanth  4/26/22    Portions of this note were dictated using Dragon.  There may be linguistic errors secondary to the use of this program.

## 2022-04-26 NOTE — PATIENT INSTRUCTIONS
-Dry ear precautions recommended as follows:   Patient to place either:  1. A silicone ear plug  2.  A cotton ball coated in Vaseline   into both ears during showering, instructed to remove afterwards to aerate ears     Patient instructed to avoid digital trauma to the ears   Instructed to notify the clinic immediately with any acute otalgia, hearing loss, purulent otorrhea  -Use ear drops to the left ear as directed

## 2022-04-26 NOTE — TELEPHONE ENCOUNTER
Call placed to patient to let her know wearing a shower cap is appropriate. Reached voicemail with identifiers. Left voicemail stating per Dr. Coco Wyatt wearing a shower cap is appropriate and asked the patient to call back to make sure she received the voicemail.

## 2022-04-27 ENCOUNTER — TELEPHONE (OUTPATIENT)
Dept: ENT CLINIC | Age: 62
End: 2022-04-27

## 2022-04-27 NOTE — TELEPHONE ENCOUNTER
Patient called to let Gerhard Jacobo know she is in a lot of pain where the cut in her ear was made, would like to talk  to Dr. Yvonne Snow when available.     Patient call back [phone]    Patient last office visit 4/26/22

## 2022-04-27 NOTE — TELEPHONE ENCOUNTER
Pain in left ear is 4-5/10, improved from yesterday - asked her to continue ear drops. She is using a shower cap to keep water out of the ear.

## 2022-04-28 ENCOUNTER — OFFICE VISIT (OUTPATIENT)
Dept: ORTHOPEDIC SURGERY | Age: 62
End: 2022-04-28
Payer: MEDICARE

## 2022-04-28 VITALS — HEIGHT: 65 IN | BODY MASS INDEX: 30.16 KG/M2 | WEIGHT: 181 LBS

## 2022-04-28 DIAGNOSIS — M17.11 PRIMARY OSTEOARTHRITIS OF RIGHT KNEE: ICD-10-CM

## 2022-04-28 DIAGNOSIS — M25.561 CHRONIC PAIN OF RIGHT KNEE: ICD-10-CM

## 2022-04-28 DIAGNOSIS — E11.9 TYPE 2 DIABETES MELLITUS WITHOUT COMPLICATION, WITHOUT LONG-TERM CURRENT USE OF INSULIN (HCC): Primary | ICD-10-CM

## 2022-04-28 DIAGNOSIS — G89.29 CHRONIC PAIN OF RIGHT KNEE: ICD-10-CM

## 2022-04-28 PROCEDURE — 99213 OFFICE O/P EST LOW 20 MIN: CPT | Performed by: ORTHOPAEDIC SURGERY

## 2022-04-28 PROCEDURE — 3046F HEMOGLOBIN A1C LEVEL >9.0%: CPT | Performed by: ORTHOPAEDIC SURGERY

## 2022-04-28 NOTE — PROGRESS NOTES
Chief Complaint    Knee Pain (Right)      History of Present Illness:  Darryle Merritts is a 63 y.o. female patient was seen approximately a month ago by Dr. Tonya Freeman. She did receive an intra-articular cortisone injection for advancing arthritis. The injection has helped but only minimally. She is here wishing to discuss total knee arthroplasty. Previous history: Patient is here for follow-up visit. The cortisone injection which was given to her at the last visit did help her. She is still questioning about a new brace. I have told her that we already gave her a brace and her insurance company will not pay for another one. Previous history: Patient presents to the office today for a follow-up visit. Patient is here with a chief complaint of right lateral knee pain. Around 2/18/2021 she did suffer an injury. She describes a hyperextension injury. Her pain is concentrated laterally. She was referred to us by her primary care physician for orthopedic care. No past medical history contributing to the region. At her last visit we did order her an MRI. MRI to evaluate for meniscal pathology along with possible microtrabecular fracture. Patient is here for MRI results. She does state that the brace that we gave her last time was helpful for her. She denies any locking or instability issues. Pain Assessment  Location of Pain: Knee  Location Modifiers: Right  Severity of Pain: 4  Quality of Pain: Sharp,Aching]    Medical History:  Past Medical History:   Diagnosis Date    Diabetes mellitus ([facility]     Diverticulosis     Hypertension     Influenza A 17/56/5548    Lichen planus     Thyroid disease      There are no problems to display for this patient. Past Surgical History:   Procedure Laterality Date    ABDOMEN SURGERY      APPENDECTOMY      CHOLECYSTECTOMY      HYSTERECTOMY       No family history on file.   Social History     Socioeconomic History    Marital status:  Spouse name: None    Number of children: None    Years of education: None    Highest education level: None   Occupational History    None   Tobacco Use    Smoking status: Former Smoker    Smokeless tobacco: Never Used   Vaping Use    Vaping Use: Never used   Substance and Sexual Activity    Alcohol use: No    Drug use: No    Sexual activity: Never   Other Topics Concern    None   Social History Narrative    None     Social Determinants of Health     Financial Resource Strain:     Difficulty of Paying Living Expenses: Not on file   Food Insecurity:     Worried About Running Out of Food in the Last Year: Not on file    Adilia of Food in the Last Year: Not on file   Transportation Needs:     Lack of Transportation (Medical): Not on file    Lack of Transportation (Non-Medical):  Not on file   Physical Activity:     Days of Exercise per Week: Not on file    Minutes of Exercise per Session: Not on file   Stress:     Feeling of Stress : Not on file   Social Connections:     Frequency of Communication with Friends and Family: Not on file    Frequency of Social Gatherings with Friends and Family: Not on file    Attends Restorationist Services: Not on file    Active Member of [address] [place] [address] or Organizations: Not on file    Attends Club or Organization Meetings: Not on file    Marital Status: Not on file   Intimate Partner Violence:     Fear of Current or Ex-Partner: Not on file    Emotionally Abused: Not on file    Physically Abused: Not on file    Sexually Abused: Not on file   Housing Stability:     Unable to Pay for Housing in the Last Year: Not on file    Number of [place] in the Last Year: Not on file    Unstable Housing in the Last Year: Not on file     Current Outpatient Medications   Medication Sig Dispense Refill    ciprofloxacin-dexamethasone (CIPRODEX) 0.3-0.1 % otic suspension Place 4 drops into the left ear 2 times daily for 5 days 7.5 mL 0    amoxicillin-clavulanate (AUGMENTIN) 813-125 MG per tablet Take 1 tablet by mouth 2 times daily for 10 days 20 tablet 0    diclofenac sodium (VOLTAREN) 1 % GEL Apply 2 g topically 2 times daily 50 g 0    metFORMIN (GLUCOPHAGE) 500 MG tablet Take 500 mg by mouth daily (with breakfast)      Multiple Vitamin (MULTIVITAMIN ADULT PO) Take by mouth       gabapentin (NEURONTIN) 400 MG capsule Take 400 mg by mouth 3 times daily as needed.  phenol (SORE THROAT) 1.4 % AERS Take 1 spray by mouth every 2 hours as needed (sore throat) 117 mL 0    sodium chloride (OCEAN) 0.65 % nasal spray 1 spray by Nasal route as needed for Congestion 1 Bottle 3    lisinopril (PRINIVIL;ZESTRIL) 10 MG tablet Take 20 mg by mouth daily.  levothyroxine (SYNTHROID) 150 MCG tablet Take 125 mcg by mouth Daily. No current facility-administered medications for this visit. Review of Systems:  Relevant review of systems reviewed and available in the patient's chart    Vital Signs: There were no vitals filed for this visit. General Exam:   Constitutional: Patient is adequately groomed with no evidence of malnutrition  DTRs: Deep tendon reflexes are intact  Mental Status: The patient is oriented to time, place and person. The patient's mood and affect are appropriate. Lymphatic: The lymphatic examination bilaterally reveals all areas to be without enlargement or induration. Vascular: Examination reveals no swelling or calf tenderness. Peripheral pulses are palpable and 2+. Neurological: The patient has good coordination. There is no weakness or sensory deficit. Body mass index is 30.12 kg/m². Right knee Examination:    Inspection:  No erythema or signs of infection. There are no cutaneous lesions    Palpation:  There is tenderness to palpation along the lateral joint line. Range of Motion:  0 extension to 120 of active flexion. Strength:  4+/5 quadriceps and hamstrings    Special Tests:   The knee is stable to varus valgus stressing/anterior posterior drawer. Negative Homans test.                                 Skin: There are no rashes, ulcerations or lesions. Gait: mildly antalgic favoring the left side    Reflex 2+ patellar    Examination of the left knee reveals intact skin. There is no focal tenderness. The patient demonstrates full painless range of motion with regard to flexion and extension. Strength is 5/5 throughout all planes. Ligamentous stability is grossly intact. Examination of the right and left hip reveals intact skin. The patient demonstrates full painless range of motion with regards to flexion, abduction, internal and external rotation. There is no tenderness about the greater trochanter. There is a negative straight leg raise against resistance. Strength is 5/5 throughout all planes. Radiology:       X-rays were ordered today reviewed of the right knee. 4 views. Standing AP, standing AP flexed, lateral, and skyline views. They demonstrate advanced lateral compartment osteoarthritis with mild to moderate changes patellofemoral joint no evidence of fractures or dislocations. Previous MRI in March 2021 does demonstrate at least moderate patellofemoral chondromalacia and more advanced medial compartment osteoarthritis with degenerative meniscal signal.    Lab work reviewed. Patient has an A1c of 9.3. Impression:  Encounter Diagnoses   Name Primary?  Chronic pain of right knee Yes    Primary osteoarthritis of right knee     Type 2 diabetes mellitus without complication, without long-term current use of insulin ([address]        Office Procedures:  No orders of the defined types were placed in this encounter. Treatment Plan:      Patient will need to lower her A1c less than 7.5 before proceeding with surgery. We will order a new A1c for 2 months and she will follow-up in the office afterwards. Other lab work within normal limits.   She is likely a total knee arthroplasty candidate not a partial.  Please take new x-rays at next visit. I discussed with Nasreen Fleming that her history, symptoms, signs and imaging are most consistent with knee arthritis. We reviewed the natural history of these conditions and treatment options ranging from conservative measures (rest, icing, activity modification, physical therapy, pain meds, cortisone injection) to surgical options. In terms of treatment, I recommended continuing with rest, icing, avoidance of painful activities, NSAIDs or pain meds as tolerated, and physical therapy. If these are not effective, cortisone injection can be considered. We discussed surgical options as well, should conservative measures fail.

## 2022-05-09 NOTE — PROGRESS NOTES
Michelle Hawkins   1960, 63 y.o. female   [id number]       Referring Provider: Paul Swartz MD  Referral Type: In an order in [address] [place] [address]    Reason for Visit: Evaluation of the cause of disorders of hearing    ADULT AUDIOLOGIC EVALUATION      Michelle Hawkins is a 63 y.o. female seen today, 5/12/2022 , for an initial audiologic evaluation. Patient was seen by Paul Swartz MD for cerumen removal prior to today's evaluation. AUDIOLOGIC AND OTHER PERTINENT MEDICAL HISTORY:      Bryan Rueda noted decreased hearing and ear pressure bilaterally with left ear worse compared to right. No additional significant otologic or medical history was reported. Michelle Hawkins denied otalgia, otorrhea, tinnitus, dizziness, imbalance, history of falls, history of occupational/recreational noise exposure, history of head trauma, history of ear surgery and family history of hearing loss. Date: 5/12/2022     IMPRESSIONS:      Today's results revealed a symmetric high-frequency sensorineural hearing loss with excellent word recognition, bilaterally. Follow medical recommendations of Paul Swartz MD.     ASSESSMENT AND FINDINGS:     Otoscopy revealed: Clear ear canals bilaterally    RIGHT EAR:  Hearing Sensitivity: Within normal limits through 3kHz sloping to a mild to moderately-severe sensorineural hearing loss through [facility]. Speech Recognition Threshold: 10 dB HL  Word Recognition: Excellent (100%), based on NU-6 25-word list at 50 dBHL using recorded speech stimuli. Tympanometry: Normal peak pressure and compliance, Type A tympanogram, consistent with normal middle ear function. LEFT EAR:  Hearing Sensitivity: Within normal limits through 3kHz sloping to a mild to moderately-severe sensorineural hearing loss through [facility]. Speech Recognition Threshold: 10 dB HL  Word Recognition: Excellent (100%), based on NU-6 25-word list at 50 dBHL using recorded speech stimuli.     Tympanometry: Normal peak pressure and compliance, Type A tympanogram, consistent with normal middle ear function. Reliability: Good   Transducer: Inserts    See scanned audiogram dated 5/12/2022  for results. PATIENT EDUCATION:       The following items were discussed with the patient:   - Good Communication Strategies    Educational information was shared in the After Visit Summary. RECOMMENDATIONS:                                                                                                                                                                                                                                                            The following items are recommended based on patient report and results from today's appointment:   - Continue medical follow-up with Raegan Luna MD.   - Retest hearing as medically indicated and/or sooner if a change in hearing is noted. - Utilize \"Good Communication Strategies\" as discussed to assist in speech understanding with communication partners.        Isrrael Bella [facility]  Audiologist    Chart CC'd to: Raegan Luna MD      Degree of   Hearing Sensitivity dB Range   Within Normal Limits (WNL) 0 - 20   Mild 20 - 40   Moderate 40 - 55   Moderately-Severe 55 - 70   Severe 70 - 90   Profound 90 +

## 2022-05-12 ENCOUNTER — PROCEDURE VISIT (OUTPATIENT)
Dept: AUDIOLOGY | Age: 62
End: 2022-05-12
Payer: MEDICARE

## 2022-05-12 ENCOUNTER — OFFICE VISIT (OUTPATIENT)
Dept: ENT CLINIC | Age: 62
End: 2022-05-12
Payer: MEDICARE

## 2022-05-12 ENCOUNTER — TELEPHONE (OUTPATIENT)
Dept: ENT CLINIC | Age: 62
End: 2022-05-12

## 2022-05-12 VITALS — BODY MASS INDEX: 29.99 KG/M2 | HEIGHT: 65 IN | WEIGHT: 180 LBS | TEMPERATURE: 97.9 F

## 2022-05-12 DIAGNOSIS — H93.8X3 EAR PRESSURE, BILATERAL: ICD-10-CM

## 2022-05-12 DIAGNOSIS — H90.3 SENSORINEURAL HEARING LOSS (SNHL), BILATERAL: ICD-10-CM

## 2022-05-12 DIAGNOSIS — L29.9 ITCHING OF EAR: ICD-10-CM

## 2022-05-12 DIAGNOSIS — H90.3 SENSORINEURAL HEARING LOSS, BILATERAL: Primary | ICD-10-CM

## 2022-05-12 DIAGNOSIS — M26.623 BILATERAL TEMPOROMANDIBULAR JOINT PAIN: Primary | ICD-10-CM

## 2022-05-12 PROCEDURE — 92567 TYMPANOMETRY: CPT | Performed by: AUDIOLOGIST

## 2022-05-12 PROCEDURE — 92557 COMPREHENSIVE HEARING TEST: CPT | Performed by: AUDIOLOGIST

## 2022-05-12 PROCEDURE — 99213 OFFICE O/P EST LOW 20 MIN: CPT | Performed by: OTOLARYNGOLOGY

## 2022-05-12 RX ORDER — LEVOTHYROXINE SODIUM 0.12 MG/1
TABLET ORAL
COMMUNITY
Start: 2022-04-04

## 2022-05-12 ASSESSMENT — ENCOUNTER SYMPTOMS
SHORTNESS OF BREATH: 0
WHEEZING: 0
ABDOMINAL PAIN: 0

## 2022-05-12 NOTE — PROGRESS NOTES
Oksana Wilson [address], [address], [address] [place] [address]  P: [phone]       Patient     Mary Lundy  1960    Chief Concern     Chief Complaint   Patient presents with    Follow-up     Patient states that her left ear pain has improved. She states she tries to keep her ears dry       Assessment and Plan      Diagnosis Orders   1. Bilateral temporomandibular joint pain     2. Sensorineural hearing loss (SNHL), bilateral     3. Itching of ear       Patient with intermittent ear fullness and pain, with tenderness to palpation of the bilateral temporomandibular joints. 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 We will have her use over the ear hearing protection, and have asked her to stop using a washcloth as we believe this is excoriating her ears and worsening symptoms of TMJ arthralgia. We have asked her to use mineral oil to the ears for cerumen removal, and Vaseline after showering. Return if symptoms worsen or fail to improve. History of Present Illness     Bilateral otalgia and ear fullness since 4/18/2022 when they attempted to remove cerumen. She is concerned that she may have cat litter in her left ear, denies any otorrhea, but significant 10/10 sharp otalgia in the left ear. Also concern for hearing loss although she is uncertain if she can hear well. No vertigo/disequilibrium, no history of chronic middle ear disease. Interval History 5/12/2022: Concern for motion sickness when she is a passenger in a moving car. Does not occur when she lies down in bed or rolls to the side. Using a washcloth regularly in both ears, with pruritus and pain in the left ear worse than the right ear. No otorrhea, tinnitus.     Past Medical History     Past Medical History:   Diagnosis Date    Diabetes mellitus ([address]     Diverticulosis     Hypertension     Influenza A 83/68/2583    Lichen planus     Thyroid disease        Past Surgical History     Past Surgical History:   Procedure Laterality Date    ABDOMEN SURGERY      APPENDECTOMY      CHOLECYSTECTOMY      HYSTERECTOMY         Family History     History reviewed. No pertinent family history. Social History     Social History     Tobacco Use    Smoking status: Former Smoker     Packs/day: 0.25     Years: 22.00     Pack years: 5.50     Start date:      Quit date:      Years since quittin.3    Smokeless tobacco: Never Used   Vaping Use    Vaping Use: Never used   Substance Use Topics    Alcohol use: No    Drug use: No        Allergies     Allergies   Allergen Reactions    Sulfa Antibiotics        Medications     Current Outpatient Medications   Medication Sig Dispense Refill    diclofenac sodium (VOLTAREN) 1 % GEL Apply 2 g topically 2 times daily 50 g 0    metFORMIN (GLUCOPHAGE) 500 MG tablet Take 500 mg by mouth daily (with breakfast)      Multiple Vitamin (MULTIVITAMIN ADULT PO) Take by mouth       gabapentin (NEURONTIN) 400 MG capsule Take 400 mg by mouth 3 times daily as needed.  phenol (SORE THROAT) 1.4 % AERS Take 1 spray by mouth every 2 hours as needed (sore throat) 117 mL 0    sodium chloride (OCEAN) 0.65 % nasal spray 1 spray by Nasal route as needed for Congestion 1 Bottle 3    lisinopril (PRINIVIL;ZESTRIL) 10 MG tablet Take 20 mg by mouth daily.  levothyroxine (SYNTHROID) 125 MCG tablet        No current facility-administered medications for this visit. Review of Systems     Review of Systems   Constitutional: Negative for activity change, chills, diaphoresis, fatigue and fever. HENT: Positive for ear pain and hearing loss. Negative for congestion and tinnitus. Eyes: Negative for visual disturbance. Respiratory: Negative for shortness of breath and wheezing. Cardiovascular: Negative for chest pain. Gastrointestinal: Negative for abdominal pain.    Musculoskeletal: Negative for neck pain and neck stiffness. Skin: Negative for rash. Neurological: Negative for dizziness, light-headedness and headaches. Hematological: Negative for adenopathy. PhysicalExam     Vitals:    05/12/22 1012   Temp: 97.9 °F (36.6 °C)   Weight: 180 lb (81.6 kg)   Height: 5' 5\" (1.651 m)       Physical Exam  Vitals reviewed. Constitutional:       Appearance: Normal appearance. HENT:      Head: Normocephalic and atraumatic. Right Ear: Tympanic membrane, ear canal and external ear normal. There is no impacted cerumen. Left Ear: Ear canal and external ear normal. There is no impacted cerumen. Ears:      Agarwal exam findings: does not lateralize. Right Rinne: AC > BC. Left Rinne: AC > BC. Comments: Tenderness to palpation of the bilateral temporomandibular joints, left > right     Nose: No congestion or rhinorrhea. Mouth/Throat:      Lips: Pink. No lesions. Mouth: Mucous membranes are moist. No oral lesions. Tongue: No lesions. Tongue does not deviate from midline. Palate: No mass and lesions. Pharynx: Oropharynx is clear. Uvula midline. No oropharyngeal exudate or posterior oropharyngeal erythema. Eyes:      Extraocular Movements: Extraocular movements intact. Pupils: Pupils are equal, round, and reactive to light. Musculoskeletal:      Cervical back: Normal range of motion and neck supple. Lymphadenopathy:      Cervical: No cervical adenopathy. Neurological:      Mental Status: She is alert. Cranial Nerves: No cranial nerve deficit. Data Review             Procedure     Christina Rosen MD  5/12/22    Portions of this note were dictated using Dragon.  There may be linguistic errors secondary to the use of this program.

## 2022-05-12 NOTE — TELEPHONE ENCOUNTER
Patient called in for clarification on the oil she is supposed to use in her ears per Dr. Huang Hills. Read the instructions from Dr. Huang Hills to the patient. The patient was very confused on where she could get the oil from and though Dr. Huang Hills had called this into the pharmacy.

## 2022-05-12 NOTE — Clinical Note
Dr. Joselin Montana,    Thank you for your referral for audiometric testing on this patient. Please see the scanned audiogram (under \"Media\" tab) and encounter note for details. If you have any questions, or if there is anything else you need, please let me know.       Loc George  Audiologist  ---  [address] [facility] - Audiology

## 2022-05-12 NOTE — PATIENT INSTRUCTIONS
Good Communication Strategies    Communication can be challenging for anyone, but can be especially difficult for those with some degree of hearing loss. While we may not be able to control every factor that may lead to difficulty with communication, there are Good Communication Strategies that we can all use in our day-to-day lives. Communication takes both parties working together for it to be successful. Tips as a Listener:   1. Control your environment. It is important to limit the amount of background noise in the room when possible. You should also consider having a good light source in the room to best see the other person. 2. Ask for clarification. Instead of saying \"What?\", you can use parts of what you heard to make a new question. For example, if you heard the word \"Thursday\" but not the rest of the week, you may ask \"What was that about Thursday? \" or \"What did you want to do Thursday? \". This shows the person talking that you are listening and will help them better explain what they are saying. 3. Be an advocate for yourself. If you are hearing but not understanding, tell the other person \"I can hear you, but I need you to slow down when you speak. \"  Or if someone is facing the other direction, say \"I cannot hear you when you are not looking at me when we talk. \"       Tips as a Talker:   - Sit or stand 3 to 6 feet away to maximize audibility         -- It is unrealistic to believe someone else will fully hear your message if you are speaking from across the room or in a different room in the house   - Stay at eye level to help with visual cues   - Make sure you have the persons attention before speaking   - Use facial expressions and gestures to accentuate your message   - Raise your voice slightly (do not scream)   - Speak slowly and distinctly   - Use short, simple sentences   - Rephrase your words if the person is having a hard time understanding you    - To avoid distortion, dont speak directly into a persons ear      Some additional items that may be helpful:   - Remain patient - this is important for both parties   - Write down items that still cannot be heard/understood. You may write with pen/paper or consider typing/texting on a cell phone or smart device. - If background noise is unavoidable, try to keep yourself in a good position in the room. By sitting at a samaniego on the side of the restaurant (preferably a corner), it will be easier to communicate than if you were sitting at a table in the middle with background noise surrounding you. Keep yourself positioned away from music speakers or heavy foot traffic.

## 2022-05-12 NOTE — PATIENT INSTRUCTIONS
Ear hygiene instructions:  -Do not use q-tips or ajay pins to clean out ears  -Do not place fingers in ear canals  -If ears feel occluded by wax, may use mineral oil (4 drops every 2 weeks) to clean ear canals   -May use vaseline to the ears after showering  -Recommend over-the-ear hearing protection when working outside

## 2022-06-13 ENCOUNTER — OFFICE VISIT (OUTPATIENT)
Dept: ORTHOPEDIC SURGERY | Age: 62
End: 2022-06-13
Payer: MEDICARE

## 2022-06-13 VITALS — BODY MASS INDEX: 29.99 KG/M2 | WEIGHT: 180 LBS | HEIGHT: 65 IN

## 2022-06-13 DIAGNOSIS — E11.9 TYPE 2 DIABETES MELLITUS WITHOUT COMPLICATION, WITHOUT LONG-TERM CURRENT USE OF INSULIN (HCC): ICD-10-CM

## 2022-06-13 DIAGNOSIS — M17.11 PRIMARY OSTEOARTHRITIS OF RIGHT KNEE: Primary | ICD-10-CM

## 2022-06-13 DIAGNOSIS — M23.300 DEGENERATIVE TEAR OF LATERAL MENISCUS OF RIGHT KNEE: ICD-10-CM

## 2022-06-13 PROCEDURE — 3046F HEMOGLOBIN A1C LEVEL >9.0%: CPT | Performed by: PHYSICIAN ASSISTANT

## 2022-06-13 PROCEDURE — 99213 OFFICE O/P EST LOW 20 MIN: CPT | Performed by: PHYSICIAN ASSISTANT

## 2022-06-13 NOTE — PROGRESS NOTES
Chief Complaint    Knee Pain (CK RIGHT KNEE )      History of Present Illness:  Mitzi Harvey is a 63 y.o. female presents to the office today for a follow-up visit. Patient is here for further discussion concerning total knee arthroplasty. Unfortunately patient has not had her A1c rechecked since her last visit. Also she has the equipment to take her blood glucose levels but she does not know how to use it. Her last A1c is 9.3 April 18, 2022. Previous history: Patient is here for follow-up visit. The cortisone injection which was given to her at the last visit did help her. She is still questioning about a new brace. I have told her that we already gave her a brace and her insurance company will not pay for another one. Previous history: Patient presents to the office today for a follow-up visit. Patient is here with a chief complaint of right lateral knee pain. Around 2/18/2021 she did suffer an injury. She describes a hyperextension injury. Her pain is concentrated laterally. She was referred to us by her primary care physician for orthopedic care. No past medical history contributing to the region. At her last visit we did order her an MRI. MRI to evaluate for meniscal pathology along with possible microtrabecular fracture. Patient is here for MRI results. She does state that the brace that we gave her last time was helpful for her. She denies any locking or instability issues.      ]    Medical History:  Past Medical History:   Diagnosis Date    Diabetes mellitus ([facility]     Diverticulosis     Hypertension     Influenza A 31/97/1129    Lichen planus     Thyroid disease      There are no problems to display for this patient. Past Surgical History:   Procedure Laterality Date    ABDOMEN SURGERY      APPENDECTOMY      CHOLECYSTECTOMY      HYSTERECTOMY (CERVIX STATUS UNKNOWN)       No family history on file.   Social History     Socioeconomic History    Marital status:  Spouse name: None    Number of children: None    Years of education: None    Highest education level: None   Occupational History    None   Tobacco Use    Smoking status: Former Smoker     Packs/day: 0.25     Years: 22.00     Pack years: 5.50     Start date: 0     Quit date:      Years since quittin.4    Smokeless tobacco: Never Used   Vaping Use    Vaping Use: Never used   Substance and Sexual Activity    Alcohol use: No    Drug use: No    Sexual activity: Never   Other Topics Concern    None   Social History Narrative    None     Social Determinants of Health     Financial Resource Strain:     Difficulty of Paying Living Expenses: Not on file   Food Insecurity:     Worried About Running Out of Food in the Last Year: Not on file    Adilia of Food in the Last Year: Not on file   Transportation Needs:     Lack of Transportation (Medical): Not on file    Lack of Transportation (Non-Medical):  Not on file   Physical Activity:     Days of Exercise per Week: Not on file    Minutes of Exercise per Session: Not on file   Stress:     Feeling of Stress : Not on file   Social Connections:     Frequency of Communication with Friends and Family: Not on file    Frequency of Social Gatherings with Friends and Family: Not on file    Attends Sabianism Services: Not on file    Active Member of [address] [place] [address] or Organizations: Not on file    Attends Club or Organization Meetings: Not on file    Marital Status: Not on file   Intimate Partner Violence:     Fear of Current or Ex-Partner: Not on file    Emotionally Abused: Not on file    Physically Abused: Not on file    Sexually Abused: Not on file   Housing Stability:     Unable to Pay for Housing in the Last Year: Not on file    Number of [place] in the Last Year: Not on file    Unstable Housing in the Last Year: Not on file     Current Outpatient Medications   Medication Sig Dispense Refill    levothyroxine (SYNTHROID) 125 MCG tablet  diclofenac sodium (VOLTAREN) 1 % GEL Apply 2 g topically 2 times daily 50 g 0    metFORMIN (GLUCOPHAGE) 500 MG tablet Take 500 mg by mouth daily (with breakfast)      Multiple Vitamin (MULTIVITAMIN ADULT PO) Take by mouth       gabapentin (NEURONTIN) 400 MG capsule Take 400 mg by mouth 3 times daily as needed.  phenol (SORE THROAT) 1.4 % AERS Take 1 spray by mouth every 2 hours as needed (sore throat) 117 mL 0    sodium chloride (OCEAN) 0.65 % nasal spray 1 spray by Nasal route as needed for Congestion 1 Bottle 3    lisinopril (PRINIVIL;ZESTRIL) 10 MG tablet Take 20 mg by mouth daily. No current facility-administered medications for this visit. Review of Systems:  Relevant review of systems reviewed and available in the patient's chart    Vital Signs: There were no vitals filed for this visit. General Exam:   Constitutional: Patient is adequately groomed with no evidence of malnutrition  DTRs: Deep tendon reflexes are intact  Mental Status: The patient is oriented to time, place and person. The patient's mood and affect are appropriate. Lymphatic: The lymphatic examination bilaterally reveals all areas to be without enlargement or induration. Vascular: Examination reveals no swelling or calf tenderness. Peripheral pulses are palpable and 2+. Neurological: The patient has good coordination. There is no weakness or sensory deficit. Body mass index is 29.95 kg/m². Right knee Examination:    Inspection:  No erythema or signs of infection. There are no cutaneous lesions    Palpation:  There is tenderness to palpation along the lateral joint line. Range of Motion:  0 extension to 120 of active flexion. Strength:  4+/5 quadriceps and hamstrings    Special Tests: The knee is stable to varus valgus stressing/anterior posterior drawer. Negative Homans test.                                 Skin: There are no rashes, ulcerations or lesions.     Gait: mildly antalgic favoring the left side    Reflex 2+ patellar    Examination of the left knee reveals intact skin. There is no focal tenderness. The patient demonstrates full painless range of motion with regard to flexion and extension. Strength is 5/5 throughout all planes. Ligamentous stability is grossly intact. Examination of the right and left hip reveals intact skin. The patient demonstrates full painless range of motion with regards to flexion, abduction, internal and external rotation. There is no tenderness about the greater trochanter. There is a negative straight leg raise against resistance. Strength is 5/5 throughout all planes. Radiology:       X-rays were ordered today reviewed of the right knee. 4 views. Standing AP, standing AP flexed, lateral, and skyline views. They demonstrate advanced lateral compartment osteoarthritis with mild to moderate changes patellofemoral joint no evidence of fractures or dislocations. Previous MRI in March 2021 does demonstrate at least moderate patellofemoral chondromalacia and more advanced medial compartment osteoarthritis with degenerative meniscal signal.    Lab work reviewed. Patient has an A1c of 9.3. Impression:  Encounter Diagnoses   Name Primary?  Primary osteoarthritis of right knee Yes    Type 2 diabetes mellitus without complication, without long-term current use of insulin (HCC)     Degenerative tear of lateral meniscus of right knee        Office Procedures:  Orders Placed This Encounter   Procedures    XR KNEE RIGHT (MIN 4 VIEWS)     Standing Status:   Future     Number of Occurrences:   1     Standing Expiration Date:   7/13/2022     Order Specific Question:   Reason for exam:     Answer:   PAIN         Treatment Plan:      Patient will need to lower her A1c less than 7.5 before proceeding with surgery. Other lab work within normal limits. She is likely a total knee arthroplasty candidate not a partial at some point in the future.   Patient would like to proceed with conservative measures until she is medically optimized. I do not feel a cortisone injection is appropriate in an uncontrolled diabetic. We will request viscosupplementation injections and see the patient back in the office afterwards. I discussed with Raman Ly that her history, symptoms, signs and imaging are most consistent with knee arthritis. We reviewed the natural history of these conditions and treatment options ranging from conservative measures (rest, icing, activity modification, physical therapy, pain meds, cortisone injection) to surgical options. In terms of treatment, I recommended continuing with rest, icing, avoidance of painful activities, NSAIDs or pain meds as tolerated, and physical therapy. If these are not effective, cortisone injection can be considered. We discussed surgical options as well, should conservative measures fail.

## 2022-06-24 ENCOUNTER — TELEPHONE (OUTPATIENT)
Dept: ORTHOPEDIC SURGERY | Age: 62
End: 2022-06-24

## 2022-07-19 ENCOUNTER — OFFICE VISIT (OUTPATIENT)
Dept: ENT CLINIC | Age: 62
End: 2022-07-19
Payer: MEDICARE

## 2022-07-19 VITALS
HEIGHT: 65 IN | WEIGHT: 176.2 LBS | BODY MASS INDEX: 29.36 KG/M2 | HEART RATE: 80 BPM | DIASTOLIC BLOOD PRESSURE: 89 MMHG | TEMPERATURE: 97.2 F | SYSTOLIC BLOOD PRESSURE: 132 MMHG

## 2022-07-19 DIAGNOSIS — L29.9 EAR ITCHING: Primary | ICD-10-CM

## 2022-07-19 DIAGNOSIS — H61.22 IMPACTED CERUMEN OF LEFT EAR: ICD-10-CM

## 2022-07-19 PROCEDURE — 99214 OFFICE O/P EST MOD 30 MIN: CPT | Performed by: OTOLARYNGOLOGY

## 2022-07-19 PROCEDURE — 69210 REMOVE IMPACTED EAR WAX UNI: CPT | Performed by: OTOLARYNGOLOGY

## 2022-07-19 RX ORDER — FLUOCINOLONE ACETONIDE 0.11 MG/ML
5 OIL AURICULAR (OTIC) 2 TIMES DAILY
Qty: 20 ML | Refills: 1 | Status: SHIPPED | OUTPATIENT
Start: 2022-07-19 | End: 2022-07-26

## 2022-07-19 ASSESSMENT — ENCOUNTER SYMPTOMS
WHEEZING: 0
SHORTNESS OF BREATH: 0
ABDOMINAL PAIN: 0

## 2022-07-19 NOTE — PATIENT INSTRUCTIONS
Ear hygiene instructions:  -Do not use q-tips or ajay pins to clean out ears  -Do not place fingers in ear canals  -Use fluocinolone oil as directed to relieve itching

## 2022-07-19 NOTE — PROGRESS NOTES
[facility], [address], [address] [facility], [address] [place] [address]  P: [phone]       Patient     Mary Tucker  1960    Chief Concern     Chief Complaint   Patient presents with    Ear Problem     Patient is here for ear pain. Patient states both ears hurt but mostly the right. Patient states this started a week ago. Patient feels like there is something deep inside her right ear. Assessment and Plan      Diagnosis Orders   1. Ear itching  fluocinolone (DERMOTIC) 0.01 % OIL oil      2. Impacted cerumen of left ear          Patient with ear itching, no significant temporomandibular joint tenderness, however she continues to use a washcloth to clean out her ears. We will have her stop this, use fluocinolone oil to see if this improves her ear itching, and she may follow-up on an as-needed basis. Return if symptoms worsen or fail to improve. History of Present Illness     Bilateral otalgia and ear fullness since 4/18/2022 when they attempted to remove cerumen. She is concerned that she may have cat litter in her left ear, denies any otorrhea, but significant 10/10 sharp otalgia in the left ear. Also concern for hearing loss although she is uncertain if she can hear well. No vertigo/disequilibrium, no history of chronic middle ear disease. Interval History 5/12/2022: Concern for motion sickness when she is a passenger in a moving car. Does not occur when she lies down in bed or rolls to the side. Using a washcloth regularly in both ears, with pruritus and pain in the left ear worse than the right ear. No otorrhea, tinnitus. Interval History 07/19/2022: Ear pain in the right ear that has been ongoing for the past 1 week - states she was helping a friend trim a palm tree and is concerned that vegetation may have fallen into her ear. States otorrhea of the right ear that is reddish-brown and \"wax colored\". Left ear with pruritus for the past year.  Pain does not radiate to the scalp or mandible. Pain is 4-5 in intensity, sharp in quality. Is still using a washcloth to clean the ears. Has been placing fingers in the ears in an effort to relieve pruritus. Past Medical History     Past Medical History:   Diagnosis Date    Diabetes mellitus ([address]     Diverticulosis     Hypertension     Influenza A     Lichen planus     Thyroid disease        Past Surgical History     Past Surgical History:   Procedure Laterality Date    ABDOMEN SURGERY      APPENDECTOMY      CHOLECYSTECTOMY      HYSTERECTOMY (CERVIX STATUS UNKNOWN)         Family History     No family history on file. Social History     Social History     Tobacco Use    Smoking status: Former     Packs/day: 0.25     Years: 22.00     Pack years: 5.50     Types: Cigarettes     Start date:      Quit date:      Years since quittin.5    Smokeless tobacco: Never   Vaping Use    Vaping Use: Never used   Substance Use Topics    Alcohol use: No    Drug use: No        Allergies     Allergies   Allergen Reactions    Sulfa Antibiotics        Medications     Current Outpatient Medications   Medication Sig Dispense Refill    fluocinolone (DERMOTIC) 0.01 % OIL oil Place 5 drops in ear(s) 2 times daily for 7 days 20 mL 1    levothyroxine (SYNTHROID) 125 MCG tablet       diclofenac sodium (VOLTAREN) 1 % GEL Apply 2 g topically 2 times daily 50 g 0    metFORMIN (GLUCOPHAGE) 500 MG tablet Take 500 mg by mouth daily (with breakfast)      Multiple Vitamin (MULTIVITAMIN ADULT PO) Take by mouth       gabapentin (NEURONTIN) 400 MG capsule Take 400 mg by mouth 3 times daily as needed. phenol (SORE THROAT) 1.4 % AERS Take 1 spray by mouth every 2 hours as needed (sore throat) 117 mL 0    sodium chloride (OCEAN) 0.65 % nasal spray 1 spray by Nasal route as needed for Congestion 1 Bottle 3    lisinopril (PRINIVIL;ZESTRIL) 10 MG tablet Take 20 mg by mouth daily. No current facility-administered medications for this visit.

## 2022-08-05 ENCOUNTER — TELEPHONE (OUTPATIENT)
Dept: ORTHOPEDIC SURGERY | Age: 62
End: 2022-08-05

## 2022-08-05 DIAGNOSIS — M17.11 PRIMARY OSTEOARTHRITIS OF RIGHT KNEE: Primary | ICD-10-CM

## 2022-08-05 DIAGNOSIS — E11.9 TYPE 2 DIABETES MELLITUS WITHOUT COMPLICATION, WITHOUT LONG-TERM CURRENT USE OF INSULIN (HCC): ICD-10-CM

## 2022-08-05 NOTE — TELEPHONE ENCOUNTER
General Question     Subject: CORTISONE INJECTIONS  Patient and /or Facility Request: Ileana Eric  Contact Number: [phone]    PATIENT CALLED IN ASKING IF SHE CAN GET A CORTISONE INJECTIONS ON HER OFFICE VISIT. . SHE WANTS TO MAKE SURE IT WILL BE IN THE OFFICE THE SAME DAY AS HER APPT. Johanna Prince PLEASE CALL PATIENT BACK AT THE ABOVE NUMBER. Johanna Martinezies

## 2022-08-05 NOTE — TELEPHONE ENCOUNTER
FRANCOIS/ Euflexxa was approved back in June         Diabetic     Submitted auth for Durolane (one shot)   Patient stated she can not make 3 trips to the office    jm

## 2022-08-17 ENCOUNTER — TELEPHONE (OUTPATIENT)
Dept: ORTHOPEDIC SURGERY | Age: 62
End: 2022-08-17

## 2022-09-06 ENCOUNTER — OFFICE VISIT (OUTPATIENT)
Dept: ENT CLINIC | Age: 62
End: 2022-09-06
Payer: MEDICARE

## 2022-09-06 VITALS — HEIGHT: 65 IN | WEIGHT: 177 LBS | TEMPERATURE: 97.3 F | BODY MASS INDEX: 29.49 KG/M2

## 2022-09-06 DIAGNOSIS — L29.9 EAR ITCHING: Primary | ICD-10-CM

## 2022-09-06 PROCEDURE — 99213 OFFICE O/P EST LOW 20 MIN: CPT | Performed by: OTOLARYNGOLOGY

## 2022-09-06 RX ORDER — FLUOCINOLONE ACETONIDE 0.11 MG/ML
5 OIL AURICULAR (OTIC) 2 TIMES DAILY
Qty: 20 ML | Refills: 1 | Status: SHIPPED | OUTPATIENT
Start: 2022-09-06 | End: 2022-09-13

## 2022-09-06 ASSESSMENT — ENCOUNTER SYMPTOMS
SHORTNESS OF BREATH: 0
ABDOMINAL PAIN: 0
WHEEZING: 0

## 2022-09-06 NOTE — PROGRESS NOTES
[facility], [address], [address] [facility], [address]  P: [phone]       Patient     Karon Sanchez  1960    Chief Concern     Chief Complaint   Patient presents with    Follow-up     Since last visit her ears are still bothering her, states she would like her ears cleaned out. Assessment and Plan      Diagnosis Orders   1. Ear itching  fluocinolone (DERMOTIC) 0.01 % OIL oil        Patient with ear itching, no significant temporomandibular joint tenderness, however she continues to use a washcloth to clean out her ears. We will have her stop this, use fluocinolone oil to see if this improves her ear itching -she previously used this just for 1 day and then stopped, booths like her to use it for the full 7-day course and consider Vaseline to bilateral ear canals    Return in about 3 months (around 12/6/2022). History of Present Illness     Bilateral otalgia and ear fullness since 4/18/2022 when they attempted to remove cerumen. She is concerned that she may have cat litter in her left ear, denies any otorrhea, but significant 10/10 sharp otalgia in the left ear. Also concern for hearing loss although she is uncertain if she can hear well. No vertigo/disequilibrium, no history of chronic middle ear disease. Interval History 5/12/2022: Concern for motion sickness when she is a passenger in a moving car. Does not occur when she lies down in bed or rolls to the side. Using a washcloth regularly in both ears, with pruritus and pain in the left ear worse than the right ear. No otorrhea, tinnitus. Interval History 07/19/2022: Ear pain in the right ear that has been ongoing for the past 1 week - states she was helping a friend trim a palm tree and is concerned that vegetation may have fallen into her ear. States otorrhea of the right ear that is reddish-brown and \"wax colored\". Left ear with pruritus for the past year. Pain does not radiate to the scalp or mandible. Pain is 4-5 in intensity, sharp in quality. Is still using a washcloth to clean the ears. Has been placing fingers in the ears in an effort to relieve pruritus. Interval history 2022: Continues to have bilateral ear pruritus, use fluocinolone for 1 day but this did not help and she stopped. Continuing to use a washcloth to clean out her ears. Past Medical History     Past Medical History:   Diagnosis Date    Diabetes mellitus ([address]     Diverticulosis     Hypertension     Influenza A     Lichen planus     Thyroid disease        Past Surgical History     Past Surgical History:   Procedure Laterality Date    ABDOMEN SURGERY      APPENDECTOMY      CHOLECYSTECTOMY      HYSTERECTOMY (CERVIX STATUS UNKNOWN)         Family History     No family history on file. Social History     Social History     Tobacco Use    Smoking status: Former     Packs/day: 0.25     Years: 22.00     Pack years: 5.50     Types: Cigarettes     Start date:      Quit date:      Years since quittin.6    Smokeless tobacco: Never   Vaping Use    Vaping Use: Never used   Substance Use Topics    Alcohol use: No    Drug use: No        Allergies     Allergies   Allergen Reactions    Sulfa Antibiotics        Medications     Current Outpatient Medications   Medication Sig Dispense Refill    Probiotic Product (PROBIOTIC DAILY PO) Take by mouth      fluocinolone (DERMOTIC) 0.01 % OIL oil Place 5 drops in ear(s) 2 times daily for 7 days 20 mL 1    levothyroxine (SYNTHROID) 125 MCG tablet       diclofenac sodium (VOLTAREN) 1 % GEL Apply 2 g topically 2 times daily 50 g 0    metFORMIN (GLUCOPHAGE) 500 MG tablet Take 500 mg by mouth daily (with breakfast)      Multiple Vitamin (MULTIVITAMIN ADULT PO) Take by mouth       gabapentin (NEURONTIN) 400 MG capsule Take 400 mg by mouth 3 times daily as needed.        phenol (SORE THROAT) 1.4 % AERS Take 1 spray by mouth every 2 hours as needed (sore throat) 117 mL 0    sodium round, and reactive to light. Musculoskeletal:      Cervical back: Normal range of motion and neck supple. Lymphadenopathy:      Cervical: No cervical adenopathy. Neurological:      Mental Status: She is alert. Cranial Nerves: No cranial nerve deficit. Data Review      Prior audiogram:         Procedure     None    Quinn Ocasio MD  9/6/22    Portions of this note were dictated using Dragon.  There may be linguistic errors secondary to the use of this program.

## 2022-09-08 ENCOUNTER — OFFICE VISIT (OUTPATIENT)
Dept: ORTHOPEDIC SURGERY | Age: 62
End: 2022-09-08
Payer: MEDICARE

## 2022-09-08 VITALS — BODY MASS INDEX: 29.99 KG/M2 | HEIGHT: 65 IN | WEIGHT: 180 LBS

## 2022-09-08 DIAGNOSIS — M17.11 PRIMARY OSTEOARTHRITIS OF RIGHT KNEE: Primary | ICD-10-CM

## 2022-09-08 PROCEDURE — 20611 DRAIN/INJ JOINT/BURSA W/US: CPT | Performed by: ORTHOPAEDIC SURGERY

## 2022-09-08 RX ORDER — LIDOCAINE HYDROCHLORIDE 10 MG/ML
20 INJECTION, SOLUTION INFILTRATION; PERINEURAL ONCE
Status: CANCELLED | OUTPATIENT
Start: 2022-09-08 | End: 2022-09-08

## 2022-09-08 RX ORDER — BUPIVACAINE HYDROCHLORIDE 2.5 MG/ML
30 INJECTION, SOLUTION INFILTRATION; PERINEURAL ONCE
Status: CANCELLED | OUTPATIENT
Start: 2022-09-08 | End: 2022-09-08

## 2022-09-08 NOTE — PROGRESS NOTES
Chief Complaint    Follow-up (Right Knee Cortisone vs Durolane )      History of Present Illness:  Aura Rhodes is a 63 y.o. female presents to the office today for a follow-up visit. Patient being treated for advanced right knee osteoarthritis. Her A1c continues to be high. Her last A1c continues to be 9.3. We have discussed the surgery but needs better control of her blood glucose before we can proceed. Her pain has returned. No new injury or trauma. Patient again has requested cortisone. We have discussed this at length and again I feel like cortisone is not recommended in uncontrolled diabetic. We have had a lengthy discussion on the benefits of Durolane and viscosupplementation injections. Previous History: presents to the office today for a follow-up visit. Patient is here for further discussion concerning total knee arthroplasty. Unfortunately patient has not had her A1c rechecked since her last visit. Also she has the equipment to take her blood glucose levels but she does not know how to use it. Her last A1c is 9.3 April 18, 2022. Previous history: Patient is here for follow-up visit. The cortisone injection which was given to her at the last visit did help her. She is still questioning about a new brace. I have told her that we already gave her a brace and her insurance company will not pay for another one. Previous history: Patient presents to the office today for a follow-up visit. Patient is here with a chief complaint of right lateral knee pain. Around 2/18/2021 she did suffer an injury. She describes a hyperextension injury. Her pain is concentrated laterally. She was referred to us by her primary care physician for orthopedic care. No past medical history contributing to the region. At her last visit we did order her an MRI. MRI to evaluate for meniscal pathology along with possible microtrabecular fracture. Patient is here for MRI results.   She does state that the brace that we gave her last time was helpful for her. She denies any locking or instability issues.      ]    Medical History:  Past Medical History:   Diagnosis Date    Diabetes mellitus ([address]     Diverticulosis     Hypertension     Influenza A     Lichen planus     Thyroid disease      There are no problems to display for this patient. Past Surgical History:   Procedure Laterality Date    ABDOMEN SURGERY      APPENDECTOMY      CHOLECYSTECTOMY      HYSTERECTOMY (CERVIX STATUS UNKNOWN)       No family history on file. Social History     Socioeconomic History    Marital status:    Tobacco Use    Smoking status: Former     Packs/day: 0.25     Years: 22.00     Pack years: 5.50     Types: Cigarettes     Start date:      Quit date:      Years since quittin.7    Smokeless tobacco: Never   Vaping Use    Vaping Use: Never used   Substance and Sexual Activity    Alcohol use: No    Drug use: No    Sexual activity: Never     Current Outpatient Medications   Medication Sig Dispense Refill    Probiotic Product (PROBIOTIC DAILY PO) Take by mouth      fluocinolone (DERMOTIC) 0.01 % OIL oil Place 5 drops in ear(s) 2 times daily for 7 days 20 mL 1    levothyroxine (SYNTHROID) 125 MCG tablet       diclofenac sodium (VOLTAREN) 1 % GEL Apply 2 g topically 2 times daily 50 g 0    metFORMIN (GLUCOPHAGE) 500 MG tablet Take 500 mg by mouth daily (with breakfast)      Multiple Vitamin (MULTIVITAMIN ADULT PO) Take by mouth       gabapentin (NEURONTIN) 400 MG capsule Take 400 mg by mouth 3 times daily as needed. phenol (SORE THROAT) 1.4 % AERS Take 1 spray by mouth every 2 hours as needed (sore throat) 117 mL 0    sodium chloride (OCEAN) 0.65 % nasal spray 1 spray by Nasal route as needed for Congestion 1 Bottle 3    lisinopril (PRINIVIL;ZESTRIL) 10 MG tablet Take 20 mg by mouth daily. No current facility-administered medications for this visit.        Review of Systems:  Relevant review of systems reviewed and available in the patient's chart    Vital Signs: There were no vitals filed for this visit. General Exam:   Constitutional: Patient is adequately groomed with no evidence of malnutrition  DTRs: Deep tendon reflexes are intact  Mental Status: The patient is oriented to time, place and person. The patient's mood and affect are appropriate. Lymphatic: The lymphatic examination bilaterally reveals all areas to be without enlargement or induration. Vascular: Examination reveals no swelling or calf tenderness. Peripheral pulses are palpable and 2+. Neurological: The patient has good coordination. There is no weakness or sensory deficit. Body mass index is 29.95 kg/m². Right knee Examination:    Inspection:  No erythema or signs of infection. There are no cutaneous lesions    Palpation:  There is tenderness to palpation along the lateral joint line. Range of Motion:  0 extension to 120 of active flexion. Strength:  4+/5 quadriceps and hamstrings    Special Tests: The knee is stable to varus valgus stressing/anterior posterior drawer. Negative Homans test.                                 Skin: There are no rashes, ulcerations or lesions. Gait: mildly antalgic favoring the left side    Reflex 2+ patellar    Examination of the left knee reveals intact skin. There is no focal tenderness. The patient demonstrates full painless range of motion with regard to flexion and extension. Strength is 5/5 throughout all planes. Ligamentous stability is grossly intact. Examination of the right and left hip reveals intact skin. The patient demonstrates full painless range of motion with regards to flexion, abduction, internal and external rotation. There is no tenderness about the greater trochanter. There is a negative straight leg raise against resistance. Strength is 5/5 throughout all planes. Radiology:       X-rays were ordered today reviewed of the right knee. 4 views.   Standing AP, standing AP flexed, lateral, and skyline views. They demonstrate advanced lateral compartment osteoarthritis with mild to moderate changes patellofemoral joint no evidence of fractures or dislocations. Previous MRI in March 2021 does demonstrate at least moderate patellofemoral chondromalacia and more advanced medial compartment osteoarthritis with degenerative meniscal signal.    Lab work reviewed. Patient has an A1c of 9.3. Impression:  Encounter Diagnosis   Name Primary? Primary osteoarthritis of right knee Yes       Office Procedures:  No orders of the defined types were placed in this encounter. The patient returns today for their t first and final Durolane injection her right knee. The risks, benefits, and complications of the injections were again discussed in detail with the patient. The risks discussed include but are not limited to infection, skin reactions, hot swollen joints, and anaphylaxis. The patient gave verbal informed consent for the injection. The patient's skin was prepped with 3 sterile gauze pads soaked with alcohol solution and the knee joint was injected with 3 mL of Durolane intra-articularly under sterile conditions. Technique: Under sterile conditions a [employer] ultrasound unit with a variable frequency (6.0-15.0 MHz) linear transducer was used to localize the placement of a 22-gauge needle into the knee joint. Findings: Successful needle placement for intra-articular Visco supplementation injection. Final images were taken and saved for permanent record. The patient tolerated the injection reasonably well. The patient was given instructions to ice of the knee and avoid strenuous activity for 24-48 hours. The patient was instructed to call the office immediately if there is increased pain, redness, warmth, fever, or chills. We will see the patient back on an as-needed basis  from this point.     Treatment Plan:      Patient will need to lower her A1c less than 7.5 before proceeding with surgery. Other lab work within normal limits. She is likely a total knee arthroplasty candidate not a partial at some point in the future. Patient would like to proceed with conservative measures until she is medically optimized. I do not feel a cortisone injection is appropriate in an uncontrolled diabetic. We will request viscosupplementation injections and see the patient back in the office afterwards. I discussed with Domingo Due that her history, symptoms, signs and imaging are most consistent with knee arthritis. We reviewed the natural history of these conditions and treatment options ranging from conservative measures (rest, icing, activity modification, physical therapy, pain meds, cortisone injection) to surgical options. In terms of treatment, I recommended continuing with rest, icing, avoidance of painful activities, NSAIDs or pain meds as tolerated, and physical therapy. If these are not effective, cortisone injection can be considered. We discussed surgical options as well, should conservative measures fail.

## 2022-10-22 ENCOUNTER — HOSPITAL ENCOUNTER (EMERGENCY)
Age: 62
Discharge: HOME | End: 2022-10-22
Attending: EMERGENCY MEDICINE
Payer: MEDICARE

## 2022-10-22 ENCOUNTER — APPOINTMENT (OUTPATIENT)
Dept: GENERAL RADIOLOGY | Age: 62
End: 2022-10-22
Payer: MEDICARE

## 2022-10-22 VITALS
TEMPERATURE: 98.2 F | WEIGHT: 182 LBS | OXYGEN SATURATION: 98 % | RESPIRATION RATE: 14 BRPM | SYSTOLIC BLOOD PRESSURE: 137 MMHG | HEART RATE: 88 BPM | DIASTOLIC BLOOD PRESSURE: 85 MMHG | HEIGHT: 65 IN | BODY MASS INDEX: 30.32 KG/M2

## 2022-10-22 DIAGNOSIS — S91.332A PUNCTURE WOUND OF LEFT FOOT WITHOUT FOREIGN BODY, INITIAL ENCOUNTER: Primary | ICD-10-CM

## 2022-10-22 PROCEDURE — 73630 X-RAY EXAM OF FOOT: CPT

## 2022-10-22 PROCEDURE — 99283 EMERGENCY DEPT VISIT LOW MDM: CPT

## 2022-10-22 ASSESSMENT — LIFESTYLE VARIABLES: HOW OFTEN DO YOU HAVE A DRINK CONTAINING ALCOHOL: NEVER

## 2022-10-22 ASSESSMENT — PAIN - FUNCTIONAL ASSESSMENT: PAIN_FUNCTIONAL_ASSESSMENT: NONE - DENIES PAIN

## 2022-10-22 NOTE — ED PROVIDER NOTES
Emergency Department Attending Note    Fredis Rossi MD    Date of ED VIsit: 10/22/2022    CHIEF COMPLAINT  Foreign Body in Skin      HISTORY OF PRESENT ILLNESS  Imer Seen is a 63 y.o. female  With Vital signs of /85   Pulse 88   Temp 98.2 °F (36.8 °C)   Resp 14   Ht 5' 5\" (1.651 m)   Wt 182 lb (82.6 kg)   SpO2 98%   BMI 30.29 kg/m²  who presents to the ED with a complaint of left foot pain. Patient seen and evaluated in room 5. Patient states she stepped on a metal grate and apparently got a puncture in the back of her heel on the left foot. She thinks that there is something in there and came in here for evaluation and possible treatment of foreign body. .  No other complaints, modifying factors or associated symptoms. Patients Past medical history reviewed and listed below  Past Medical History:   Diagnosis Date    Diabetes mellitus ([facility] [address]     Diverticulosis     Hypertension     Influenza A     Lichen planus     Thyroid disease      Past Surgical History:   Procedure Laterality Date    ABDOMEN SURGERY      APPENDECTOMY      CHOLECYSTECTOMY      HYSTERECTOMY (CERVIX STATUS UNKNOWN)         I have reviewed the following from the nursing documentation. History reviewed. No pertinent family history.   Social History     Socioeconomic History    Marital status:      Spouse name: Not on file    Number of children: Not on file    Years of education: Not on file    Highest education level: Not on file   Occupational History    Not on file   Tobacco Use    Smoking status: Former     Packs/day: 0.25     Years: 22.00     Pack years: 5.50     Types: Cigarettes     Start date: 0     Quit date:      Years since quittin.8    Smokeless tobacco: Never   Vaping Use    Vaping Use: Never used   Substance and Sexual Activity    Alcohol use: No    Drug use: No    Sexual activity: Never   Other Topics Concern    Not on file   Social History Narrative    Not on file     Social Determinants of Health     Financial Resource Strain: Not on file   Food Insecurity: Not on file   Transportation Needs: Not on file   Physical Activity: Not on file   Stress: Not on file   Social Connections: Not on file   Intimate Partner Violence: Not on file   Housing Stability: Not on file     No current facility-administered medications for this encounter. Current Outpatient Medications   Medication Sig Dispense Refill    Probiotic Product (PROBIOTIC DAILY PO) Take by mouth      levothyroxine (SYNTHROID) 125 MCG tablet       diclofenac sodium (VOLTAREN) 1 % GEL Apply 2 g topically 2 times daily 50 g 0    metFORMIN (GLUCOPHAGE) 500 MG tablet Take 500 mg by mouth daily (with breakfast)      Multiple Vitamin (MULTIVITAMIN ADULT PO) Take by mouth       gabapentin (NEURONTIN) 400 MG capsule Take 400 mg by mouth 3 times daily as needed. phenol (SORE THROAT) 1.4 % AERS Take 1 spray by mouth every 2 hours as needed (sore throat) 117 mL 0    sodium chloride (OCEAN) 0.65 % nasal spray 1 spray by Nasal route as needed for Congestion 1 Bottle 3    lisinopril (PRINIVIL;ZESTRIL) 10 MG tablet Take 20 mg by mouth daily. Allergies   Allergen Reactions    Sulfa Antibiotics        REVIEW OF SYSTEMS  10 systems reviewed, pertinent positives per HPI otherwise noted to be negative     PHYSICAL EXAM  /85   Pulse 88   Temp 98.2 °F (36.8 °C)   Resp 14   Ht 5' 5\" (1.651 m)   Wt 182 lb (82.6 kg)   SpO2 98%   BMI 30.29 kg/m²   GENERAL APPEARANCE: Awake and alert. Cooperative. In no obvious distress. HEAD: Normocephalic. Atraumatic. EYES: PERRL. EOM's grossly intact. ENT: Mucous membranes are pink and moist.   NECK: Supple. HEART: RRR. No murmurs. LUNGS: Respirations unlabored. CTAB. Good air exchange. ABDOMEN: Soft. Non-distended. Non-tender. No masses. No organomegaly. No guarding or rebound. EXTREMITIES: No peripheral edema. Moves all extremities equally. All extremities neurovascularly intact. There is a puncture wound in the posterior aspect of the left heel I am unclear if there is anything in it or if it is just dirty  SKIN: Warm and dry. No acute rashes. NEUROLOGICAL: Alert and oriented. Strength 5/5, sensation intact. Gait normal.   PSYCHIATRIC: Normal mood and affect. No HI or SI expressed to me. RADIOLOGY    If acquired see below     EKG:     If acquired see below       ED COURSE/MDM    Patient puncture wound was cleaned. The radiograph revealed no foreign body. Patient was made aware of that and was discharged advised to keep the area clean    ED Course as of 10/22/22 2038   Sat Oct 22, 2022   2038 IMPRESSION:  1. No acute abnormality involving the left foot with no radiopaque foreign  bodies seen. [DL]      ED Course User Index  [DL] Candice Madera MD       The ED course and plan were reviewed and results discussed with the patient    The patient understood and agreed with the Discharge/transfer planning.     CLINICAL IMPRESSION and DISPOSITION    Willy Rueda was stable and diagnosed with puncture wound heel               Candice Madera MD  10/22/22 2039

## 2022-10-22 NOTE — ED NOTES
Reports her left heel hit her heater cover and she is concerned there's rust in her skin.       Heather Fofana RN  10/22/22 [id number]

## 2022-10-23 NOTE — DISCHARGE INSTRUCTIONS
There is nothing in your foot. That is metal.  So it looks like it may have punctured it in the left some dirt and narrow. So I would keep the area clean make sure it does not get infected if it gets infected certainly come back to the emergency department or see your doctor infection.   If the infection comes about her to be redness swelling pus drainage

## 2022-10-23 NOTE — ED NOTES
AVS provided and reviewed with the patient. The patient verbalized understanding of care at home, follow up care, and emergent symptoms to return for. No questions or concerns verbalized at this time. The patient is alert, oriented, stable, and ambulatory out of the department at the time of discharge.          Deven Emmanuel RN  10/22/22 [id number]

## 2022-12-07 ENCOUNTER — OFFICE VISIT (OUTPATIENT)
Dept: ENT CLINIC | Age: 62
End: 2022-12-07
Payer: MEDICARE

## 2022-12-07 VITALS
BODY MASS INDEX: 30.32 KG/M2 | TEMPERATURE: 97.2 F | DIASTOLIC BLOOD PRESSURE: 90 MMHG | SYSTOLIC BLOOD PRESSURE: 124 MMHG | HEIGHT: 65 IN | HEART RATE: 81 BPM | WEIGHT: 182 LBS

## 2022-12-07 DIAGNOSIS — H60.8X3 CHRONIC ECZEMATOUS OTITIS EXTERNA OF BOTH EARS: ICD-10-CM

## 2022-12-07 DIAGNOSIS — M26.629 ARTHRALGIA OF TEMPOROMANDIBULAR JOINT, UNSPECIFIED LATERALITY: Primary | ICD-10-CM

## 2022-12-07 PROCEDURE — 99213 OFFICE O/P EST LOW 20 MIN: CPT | Performed by: OTOLARYNGOLOGY

## 2022-12-07 ASSESSMENT — ENCOUNTER SYMPTOMS
SHORTNESS OF BREATH: 0
ABDOMINAL PAIN: 0
WHEEZING: 0

## 2022-12-07 NOTE — PROGRESS NOTES
[facility], [address], [facility], [address] [place] [address]  P: [phone]       Patient     Kirby Altamirano  1960    Chief Concern     Chief Complaint   Patient presents with    Follow-up     Patient is here today for her 3 month follow up of itchy ears. Patient states her left ear started hurting few days ago. Patient thinks both ears are clogged. She states she would like them cleaned out and suctioned. Patient states the itching is not as bad as it was. Assessment and Plan      Diagnosis Orders   1. Arthralgia of temporomandibular joint, unspecified laterality        2. Chronic eczematous otitis externa of both ears          Patient with ear itching, right TMJ arthralgia - asked her to use dry ear precautions, use fluocinolone oil - in the last 3 months she used it every other day for 4-5 days then stopped, not observing dry ear precautions, not using vaseline in the ears, continues to state left ear itching. We reiterated the above and will see her on an as needed basis for suspected eczematous otitis externa. Return if symptoms worsen or fail to improve. History of Present Illness     Bilateral otalgia and ear fullness since 4/18/2022 when they attempted to remove cerumen. She is concerned that she may have cat litter in her left ear, denies any otorrhea, but significant 10/10 sharp otalgia in the left ear. Also concern for hearing loss although she is uncertain if she can hear well. No vertigo/disequilibrium, no history of chronic middle ear disease. Interval History 5/12/2022: Concern for motion sickness when she is a passenger in a moving car. Does not occur when she lies down in bed or rolls to the side. Using a washcloth regularly in both ears, with pruritus and pain in the left ear worse than the right ear. No otorrhea, tinnitus.     Interval History 07/19/2022: Ear pain in the right ear that has been ongoing for the past 1 week - states she was helping a friend trim a palm tree and is concerned that vegetation may have fallen into her ear. States otorrhea of the right ear that is reddish-brown and \"wax colored\". Left ear with pruritus for the past year. Pain does not radiate to the scalp or mandible. Pain is 4-5 in intensity, sharp in quality. Is still using a washcloth to clean the ears. Has been placing fingers in the ears in an effort to relieve pruritus. Interval history 2022: Continues to have bilateral ear pruritus, use fluocinolone for 1 day but this did not help and she stopped. Continuing to use a washcloth to clean out her ears. Interval History 2022: Used fluocinolone 3-4 times but then stopped. Continues to have right TMJ arthalgia, and left pruritus in the left ear canal - no hearing loss, jarvis otalgia, otorrhea. Past Medical History     Past Medical History:   Diagnosis Date    Diabetes mellitus ([facility] [address]     Diverticulosis     Hypertension     Influenza A 10/54/3172    Lichen planus     Thyroid disease        Past Surgical History     Past Surgical History:   Procedure Laterality Date    ABDOMEN SURGERY      APPENDECTOMY      CHOLECYSTECTOMY      HYSTERECTOMY (CERVIX STATUS UNKNOWN)         Family History     No family history on file.     Social History     Social History     Tobacco Use    Smoking status: Former     Packs/day: 0.25     Years: 22.00     Pack years: 5.50     Types: Cigarettes     Start date:      Quit date:      Years since quittin.9    Smokeless tobacco: Never   Vaping Use    Vaping Use: Never used   Substance Use Topics    Alcohol use: No    Drug use: No        Allergies     Allergies   Allergen Reactions    Sulfa Antibiotics        Medications     Current Outpatient Medications   Medication Sig Dispense Refill    Probiotic Product (PROBIOTIC DAILY PO) Take by mouth      levothyroxine (SYNTHROID) 125 MCG tablet       diclofenac sodium (VOLTAREN) 1 % GEL Apply 2 g topically 2 times daily 50 g 0 metFORMIN (GLUCOPHAGE) 500 MG tablet Take 500 mg by mouth daily (with breakfast)      Multiple Vitamin (MULTIVITAMIN ADULT PO) Take by mouth       gabapentin (NEURONTIN) 400 MG capsule Take 400 mg by mouth 3 times daily as needed. phenol (SORE THROAT) 1.4 % AERS Take 1 spray by mouth every 2 hours as needed (sore throat) 117 mL 0    sodium chloride (OCEAN) 0.65 % nasal spray 1 spray by Nasal route as needed for Congestion 1 Bottle 3    lisinopril (PRINIVIL;ZESTRIL) 10 MG tablet Take 20 mg by mouth daily. No current facility-administered medications for this visit. Review of Systems     Review of Systems   Constitutional:  Negative for activity change, chills, diaphoresis, fatigue and fever. HENT:  Positive for ear pain and hearing loss. Negative for congestion and tinnitus. Eyes:  Negative for visual disturbance. Respiratory:  Negative for shortness of breath and wheezing. Cardiovascular:  Negative for chest pain. Gastrointestinal:  Negative for abdominal pain. Musculoskeletal:  Negative for neck pain and neck stiffness. Skin:  Negative for rash. Neurological:  Negative for dizziness, light-headedness and headaches. Hematological:  Negative for adenopathy. PhysicalExam     Vitals:    12/07/22 0855   BP: (!) 124/90   Site: Right Wrist   Position: Sitting   Pulse: 81   Temp: 97.2 °F (36.2 °C)   TempSrc: Infrared   Weight: 182 lb (82.6 kg)   Height: 5' 5\" (1.651 m)       Physical Exam  Vitals reviewed. Constitutional:       Appearance: Normal appearance. HENT:      Head: Normocephalic and atraumatic. Right Ear: Tympanic membrane, ear canal and external ear normal. There is no impacted cerumen. Left Ear: Ear canal and external ear normal. There is no impacted cerumen.       Ears:      Comments: Bilateral TMJ arthralgia noted - bilateral ear canals dry without obstruction/skin flaking, no cholesteatoma or exposed bone noted, no purulent contents     Nose: No congestion or rhinorrhea. Mouth/Throat:      Lips: Pink. No lesions. Mouth: Mucous membranes are moist. No oral lesions. Tongue: No lesions. Tongue does not deviate from midline. Palate: No mass and lesions. Pharynx: Oropharynx is clear. Uvula midline. No oropharyngeal exudate or posterior oropharyngeal erythema. Eyes:      Extraocular Movements: Extraocular movements intact. Pupils: Pupils are equal, round, and reactive to light. Musculoskeletal:      Cervical back: Normal range of motion and neck supple. Lymphadenopathy:      Cervical: No cervical adenopathy. Neurological:      Mental Status: She is alert. Cranial Nerves: No cranial nerve deficit. Data Review      Prior audiogram:         Procedure     None    Tamar Adorno MD  12/7/22    Portions of this note were dictated using Dragon.  There may be linguistic errors secondary to the use of this program.

## 2022-12-07 NOTE — PATIENT INSTRUCTIONS
Ear hygiene instructions:  -Do not use q-tips or ajay pins to clean out ears  -Do not place fingers in ear canals  -If ears feel occluded by wax, may use mineral oil (4 drops every week ) to clean ear canals and moisturize them  -Continue dry ear precautions - do not let water get into ears when showering (use cotton balls coated in vaseline) - use a thin film of vaseline to the ear canals after showering

## 2023-08-10 ENCOUNTER — HOSPITAL ENCOUNTER (OUTPATIENT)
Dept: CT IMAGING | Age: 63
Discharge: HOME | End: 2023-08-10
Attending: INTERNAL MEDICINE
Payer: MEDICARE

## 2023-08-10 ENCOUNTER — TELEPHONE (OUTPATIENT)
Dept: CARDIOLOGY CLINIC | Age: 63
End: 2023-08-10

## 2023-08-10 ENCOUNTER — HOSPITAL ENCOUNTER (OUTPATIENT)
Dept: CARDIOLOGY | Age: 63
Discharge: HOME | End: 2023-08-10
Payer: MEDICARE

## 2023-08-10 DIAGNOSIS — R07.9 CHEST PAIN, UNSPECIFIED TYPE: ICD-10-CM

## 2023-08-10 LAB
LV EF: 55 %
LVEF MODALITY: NORMAL

## 2023-08-10 PROCEDURE — 93306 TTE W/DOPPLER COMPLETE: CPT

## 2023-08-10 PROCEDURE — 75571 CT HRT W/O DYE W/CA TEST: CPT

## 2023-08-10 NOTE — TELEPHONE ENCOUNTER
Pt returned call. Pt sts she would like to speak with JOSÉ before moving ahead. Pt does not like taking medications and she has bad knee would not be able to do Treadmill, but does not want to have dyes. Scheduled pt for 10/2023 is this ok or should pt be add-on. Pt see JOSÉ at [address] Please advise.

## 2023-08-10 NOTE — TELEPHONE ENCOUNTER
----- Message from Anita Powell MD sent at 8/10/2023  1:41 PM EDT -----  Echo:  Please notify patient that their echo looks ok  Normal heart fxn     CT calcium Score:  Please notify patient that their Ca score is elevated which indicates CAD. Can not adequately  severity based on this test   Rec stress test as discussed w/ Dr Galileo Steiner - if agreeable: GXT myoview   Rec ASA 81 mg daily and crestor 200 mg daily   If she wants to discuss in detail, or if reluctant to have stress test and take the meds, should make OV w/ Dr Galileo Steiner to discuss.

## 2023-08-11 NOTE — TELEPHONE ENCOUNTER
JOSÉ oot. Sending to [facility] Doctor. Will also send to [facility] upon return.

## 2023-08-14 NOTE — TELEPHONE ENCOUNTER
This is issues for Dr. Annie Carranza to address upon his return. She has questions for him. Get appt to see him please so they can discuss. Thanks.

## 2023-08-17 ENCOUNTER — TELEPHONE (OUTPATIENT)
Dept: CARDIOLOGY CLINIC | Age: 63
End: 2023-08-17

## 2023-08-17 NOTE — TELEPHONE ENCOUNTER
Pt's insurance company calling to get more information to work on [address] for CTA. Please advise.

## 2023-08-18 NOTE — TELEPHONE ENCOUNTER
Called and spoke with insurance, they stated that the only possible way CT calcium score will be covered is through a peer to peer review. They are requesting it be done by the end of today, (08/18/23), please call [phone] to complete a peer to peer, ty.

## 2023-08-21 NOTE — TELEPHONE ENCOUNTER
Attempted to call for peer to peer anxiety has been made aware of this on return from vacation. Unfortunately times past 78 prior authorization for this exam which was completed 8/10. They are not willing to submit repeat request.  Direct appeal to the company by the patient may be performed and I would support her in this with documentation if needed. We will let her know this when I call regarding her concerns with recommendations for stress test and medications for coronary artery disease by CT.     JOSÉ

## 2023-08-23 NOTE — TELEPHONE ENCOUNTER
Attempted to call at # listed. Left VM as could not reach. Happy to discuss results to date and next best steps. Next OV is scheduled for mid October so I would like to speak with her prior to this.  If calls back, pls let me know time convenient to call her back and discussion> TY    JOSÉ

## 2023-08-28 NOTE — TELEPHONE ENCOUNTER
Called and spoke with pt, she said the best time for you to call would be around 9-10 am tomorrow, (08/29) and can be reached at [phone]

## 2023-08-29 ENCOUNTER — TELEPHONE (OUTPATIENT)
Dept: CARDIOLOGY CLINIC | Age: 63
End: 2023-08-29

## 2023-08-29 NOTE — TELEPHONE ENCOUNTER
Pt states she has been having chest pain on and off since last night. Advised pt when having chest we usually recommend going to the ED. Pt states she will go to ED if it gets any worse. Asked pt if she wanted to speak to medical staff. Pt declined said she wants to speak with Rjm.

## 2023-08-29 NOTE — TELEPHONE ENCOUNTER
Called pt, discussed coronary calcium score. She reports she's still been having intermittent chest pain. Some ' heaviness' on her chest. At rest the night prior. She also went to chiropractor today, aggravated chest and back discomfort. Recommending baby aspirin daily, Lipitor 40 mg nightly. She states she is going to take baby aspirin but prefers garlic, apple cider vinegar and other remedies for cholesterol. She declines cholesterol medication. Recommending stress test for further evaluation. Instructed to call [address] [place] [address]. The test is ordered. She will call to schedule.      Ramila Cardona MD, [address] [facility]  [phone] [facility]  [phone] [facility]

## 2023-09-06 ENCOUNTER — OFFICE VISIT (OUTPATIENT)
Dept: ENT CLINIC | Age: 63
End: 2023-09-06
Payer: MEDICARE

## 2023-09-06 VITALS — BODY MASS INDEX: 29.49 KG/M2 | OXYGEN SATURATION: 98 % | HEIGHT: 65 IN | WEIGHT: 177 LBS | TEMPERATURE: 98.3 F

## 2023-09-06 DIAGNOSIS — H60.8X3 CHRONIC ECZEMATOUS OTITIS EXTERNA OF BOTH EARS: ICD-10-CM

## 2023-09-06 DIAGNOSIS — M26.629 ARTHRALGIA OF TEMPOROMANDIBULAR JOINT, UNSPECIFIED LATERALITY: Primary | ICD-10-CM

## 2023-09-06 PROCEDURE — 99213 OFFICE O/P EST LOW 20 MIN: CPT | Performed by: OTOLARYNGOLOGY

## 2023-09-06 ASSESSMENT — ENCOUNTER SYMPTOMS
SHORTNESS OF BREATH: 0
ABDOMINAL PAIN: 0
WHEEZING: 0

## 2023-09-06 NOTE — PROGRESS NOTES
[address], [address] [place] [address], [address] [facility],[address]  P: [phone]       Patient     Jazlyn Kebede  1960    Chief Concern     Chief Complaint   Patient presents with    Follow-up     Patient is here today for a follow up of ears. Patient states her ears are hurting for the past month. Patient states she has drainage after a shower usually. Itchiness is not as bad. Assessment and Plan      Diagnosis Orders   1. Arthralgia of temporomandibular joint, unspecified laterality        2. Chronic eczematous otitis externa of both ears          Ongoing intermittent itching of the ears left greater than right and continues to not use fluocinolone drops or Vaseline. Reiterated the importance of doing this to manage symptoms. Having some mild discomfort in the left ear localizes to TMJ. Discussed conservative measures including NSAIDs, warm compresses. Return as needed  History of Present Illness     Bilateral otalgia and ear fullness since 4/18/2022 when they attempted to remove cerumen. She is concerned that she may have cat litter in her left ear, denies any otorrhea, but significant 10/10 sharp otalgia in the left ear. Also concern for hearing loss although she is uncertain if she can hear well. No vertigo/disequilibrium, no history of chronic middle ear disease. Interval History 5/12/2022: Concern for motion sickness when she is a passenger in a moving car. Does not occur when she lies down in bed or rolls to the side. Using a washcloth regularly in both ears, with pruritus and pain in the left ear worse than the right ear. No otorrhea, tinnitus. Interval History 07/19/2022: Ear pain in the right ear that has been ongoing for the past 1 week - states she was helping a friend trim a palm tree and is concerned that vegetation may have fallen into her ear. States otorrhea of the right ear that is reddish-brown and \"wax colored\".  Left ear with pruritus for the past

## 2023-11-06 NOTE — PROGRESS NOTES
to resolution. Duration depends upon how stressed she is. Denies palpitations, dizziness or syncope. Former smoker, quit . Past Medical History:   has a past medical history of Diabetes mellitus ([address]), Diverticulosis, Hypertension, Influenza A, Lichen planus, and Thyroid disease. Surgical History:   has a past surgical history that includes Hysterectomy; Appendectomy; Cholecystectomy; and Abdomen surgery. Social History:   reports that she quit smoking about 24 years ago. Her smoking use included cigarettes. She started smoking about 46 years ago. She has a 5.50 pack-year smoking history. She has never used smokeless tobacco. She reports that she does not drink alcohol and does not use drugs. Family History: Mother had heart disease, late 63's,  at  78   Father had DM,  at 80, possible cardiac events  Brother has stents, early 52's, smoker  Son-no known heart disease    Home Medications:  Were reviewed and are listed in nursing record and/or below  Prior to Admission medications    Medication Sig Start Date End Date Taking?  Authorizing Provider   glipiZIDE (GLUCOTROL) 5 MG tablet  10/30/23  Yes Zachery Purcell MD   aspirin 81 MG EC tablet Take 1 tablet by mouth daily 23  Yes Margret Florez MD   Probiotic Product (PROBIOTIC DAILY PO) Take by mouth   Yes Zachery Purcell MD   levothyroxine (SYNTHROID) 125 MCG tablet  22  Yes Zachery Purcell MD   diclofenac sodium (VOLTAREN) 1 % GEL Apply 2 g topically 2 times daily 21  Yes Carmelita Jackson MD   lisinopril (PRINIVIL;ZESTRIL) 20 MG tablet Take 1 tablet by mouth daily   Yes Zachery Purcell MD   metFORMIN (GLUCOPHAGE) 500 MG tablet Take 1 tablet by mouth daily (with breakfast)  Patient not taking: Reported on 2023    Zachery Purcell MD   Multiple Vitamin (MULTIVITAMIN ADULT PO) Take by mouth   Patient not taking: Reported on 2023    Zachery Purcell MD   gabapentin (NEURONTIN)

## 2023-11-07 ENCOUNTER — OFFICE VISIT (OUTPATIENT)
Dept: CARDIOLOGY CLINIC | Age: 63
End: 2023-11-07
Payer: MEDICARE

## 2023-11-07 VITALS
SYSTOLIC BLOOD PRESSURE: 132 MMHG | BODY MASS INDEX: 29.49 KG/M2 | HEART RATE: 80 BPM | HEIGHT: 65 IN | WEIGHT: 177 LBS | DIASTOLIC BLOOD PRESSURE: 76 MMHG | OXYGEN SATURATION: 95 %

## 2023-11-07 DIAGNOSIS — I10 PRIMARY HYPERTENSION: ICD-10-CM

## 2023-11-07 DIAGNOSIS — I20.9 NEW-ONSET ANGINA (HCC): Primary | ICD-10-CM

## 2023-11-07 DIAGNOSIS — I25.10 CORONARY ARTERY CALCIFICATION SEEN ON CAT SCAN: ICD-10-CM

## 2023-11-07 DIAGNOSIS — R06.09 DYSPNEA ON EXERTION: ICD-10-CM

## 2023-11-07 PROBLEM — R06.02 SOB (SHORTNESS OF BREATH): Status: ACTIVE | Noted: 2023-11-07

## 2023-11-07 PROCEDURE — 1036F TOBACCO NON-USER: CPT | Performed by: INTERNAL MEDICINE

## 2023-11-07 PROCEDURE — 3017F COLORECTAL CA SCREEN DOC REV: CPT | Performed by: INTERNAL MEDICINE

## 2023-11-07 PROCEDURE — 99214 OFFICE O/P EST MOD 30 MIN: CPT | Performed by: INTERNAL MEDICINE

## 2023-11-07 PROCEDURE — G8417 CALC BMI ABV UP PARAM F/U: HCPCS | Performed by: INTERNAL MEDICINE

## 2023-11-07 PROCEDURE — G8427 DOCREV CUR MEDS BY ELIG CLIN: HCPCS | Performed by: INTERNAL MEDICINE

## 2023-11-07 PROCEDURE — G8484 FLU IMMUNIZE NO ADMIN: HCPCS | Performed by: INTERNAL MEDICINE

## 2023-11-07 PROCEDURE — 3075F SYST BP GE 130 - 139MM HG: CPT | Performed by: INTERNAL MEDICINE

## 2023-11-07 PROCEDURE — 3078F DIAST BP <80 MM HG: CPT | Performed by: INTERNAL MEDICINE

## 2023-11-07 RX ORDER — ASPIRIN 81 MG/1
81 TABLET ORAL DAILY
Qty: 30 TABLET | Refills: 5
Start: 2023-11-07

## 2023-11-07 RX ORDER — GLIPIZIDE 5 MG/1
TABLET ORAL
COMMUNITY
Start: 2023-10-30

## 2023-11-07 NOTE — PATIENT INSTRUCTIONS
Your provider has ordered testing for further evaluation. An order/prescription has been included in your paper work. To schedule outpatient testing, contact Central Scheduling by calling [facility] ([phone]).

## 2023-11-20 ENCOUNTER — HOSPITAL ENCOUNTER (OUTPATIENT)
Dept: NON INVASIVE DIAGNOSTICS | Age: 63
Discharge: HOME | End: 2023-11-20

## 2023-11-28 ENCOUNTER — OFFICE VISIT (OUTPATIENT)
Dept: CARDIOLOGY CLINIC | Age: 63
End: 2023-11-28
Payer: MEDICARE

## 2023-11-28 VITALS
HEART RATE: 95 BPM | OXYGEN SATURATION: 95 % | WEIGHT: 181 LBS | SYSTOLIC BLOOD PRESSURE: 118 MMHG | HEIGHT: 65 IN | DIASTOLIC BLOOD PRESSURE: 74 MMHG | BODY MASS INDEX: 30.16 KG/M2

## 2023-11-28 DIAGNOSIS — I25.10 CORONARY ARTERY CALCIFICATION SEEN ON CAT SCAN: ICD-10-CM

## 2023-11-28 DIAGNOSIS — R07.89 OTHER CHEST PAIN: Primary | ICD-10-CM

## 2023-11-28 DIAGNOSIS — I10 PRIMARY HYPERTENSION: ICD-10-CM

## 2023-11-28 PROCEDURE — G8427 DOCREV CUR MEDS BY ELIG CLIN: HCPCS | Performed by: INTERNAL MEDICINE

## 2023-11-28 PROCEDURE — 3074F SYST BP LT 130 MM HG: CPT | Performed by: INTERNAL MEDICINE

## 2023-11-28 PROCEDURE — 1036F TOBACCO NON-USER: CPT | Performed by: INTERNAL MEDICINE

## 2023-11-28 PROCEDURE — 99214 OFFICE O/P EST MOD 30 MIN: CPT | Performed by: INTERNAL MEDICINE

## 2023-11-28 PROCEDURE — G8417 CALC BMI ABV UP PARAM F/U: HCPCS | Performed by: INTERNAL MEDICINE

## 2023-11-28 PROCEDURE — G8484 FLU IMMUNIZE NO ADMIN: HCPCS | Performed by: INTERNAL MEDICINE

## 2023-11-28 PROCEDURE — 3017F COLORECTAL CA SCREEN DOC REV: CPT | Performed by: INTERNAL MEDICINE

## 2023-11-28 PROCEDURE — 3078F DIAST BP <80 MM HG: CPT | Performed by: INTERNAL MEDICINE

## 2023-11-28 NOTE — PATIENT INSTRUCTIONS
PLAN:  Recommend stress test as previously recommended. Try to schedule it before the end of this year.   -Order printed for patient reference. 2.  We will call you with the results. Follow up as scheduled in March 2024.

## 2023-11-30 ENCOUNTER — TELEPHONE (OUTPATIENT)
Dept: CARDIOLOGY CLINIC | Age: 63
End: 2023-11-30

## 2023-11-30 DIAGNOSIS — R07.89 OTHER CHEST PAIN: Primary | ICD-10-CM

## 2023-11-30 DIAGNOSIS — I20.9 NEW-ONSET ANGINA (HCC): ICD-10-CM

## 2023-11-30 DIAGNOSIS — I25.10 CORONARY ARTERY CALCIFICATION SEEN ON CAT SCAN: ICD-10-CM

## 2023-11-30 DIAGNOSIS — R06.09 DYSPNEA ON EXERTION: ICD-10-CM

## 2023-11-30 NOTE — TELEPHONE ENCOUNTER
Christopher Burch with CS called and stated Stress Test order needs a new dx code. When revised please contact cs to notify them a revised order has been submitted.  [facility] ph#[phone]

## 2023-12-12 ENCOUNTER — TELEPHONE (OUTPATIENT)
Dept: CARDIOLOGY CLINIC | Age: 63
End: 2023-12-12

## 2023-12-12 ENCOUNTER — HOSPITAL ENCOUNTER (OUTPATIENT)
Dept: NUCLEAR MEDICINE | Age: 63
Discharge: HOME | End: 2023-12-12
Payer: MEDICARE

## 2023-12-12 ENCOUNTER — HOSPITAL ENCOUNTER (OUTPATIENT)
Dept: NON INVASIVE DIAGNOSTICS | Age: 63
Discharge: HOME | End: 2023-12-12
Payer: MEDICARE

## 2023-12-12 DIAGNOSIS — I10 PRIMARY HYPERTENSION: ICD-10-CM

## 2023-12-12 PROCEDURE — 6360000002 HC RX W HCPCS: Performed by: INTERNAL MEDICINE

## 2023-12-12 PROCEDURE — 3430000000 HC RX DIAGNOSTIC RADIOPHARMACEUTICAL: Performed by: INTERNAL MEDICINE

## 2023-12-12 PROCEDURE — A9502 TC99M TETROFOSMIN: HCPCS | Performed by: INTERNAL MEDICINE

## 2023-12-12 PROCEDURE — 78452 HT MUSCLE IMAGE SPECT MULT: CPT

## 2023-12-12 PROCEDURE — 93017 CV STRESS TEST TRACING ONLY: CPT

## 2023-12-12 RX ORDER — REGADENOSON 0.08 MG/ML
0.4 INJECTION, SOLUTION INTRAVENOUS
Status: COMPLETED | OUTPATIENT
Start: 2023-12-12 | End: 2023-12-12

## 2023-12-12 RX ADMIN — TETROFOSMIN 10.8 MILLICURIE: 1.38 INJECTION, POWDER, LYOPHILIZED, FOR SOLUTION INTRAVENOUS at 07:22

## 2023-12-12 RX ADMIN — TETROFOSMIN 33.2 MILLICURIE: 1.38 INJECTION, POWDER, LYOPHILIZED, FOR SOLUTION INTRAVENOUS at 08:26

## 2023-12-12 RX ADMIN — REGADENOSON 0.4 MG: 0.08 INJECTION, SOLUTION INTRAVENOUS at 08:27

## 2023-12-12 NOTE — TELEPHONE ENCOUNTER
----- Message from Hemant López MD sent at 12/12/2023 11:44 AM EST -----  She has hardening of the outer rim of the arteries as indicated by coronary calcium score/CT. Stress test shows normal blood flow to the heart with no indication of underlying severe blockages to suggest chest pain is of heart etiology. The results of this study are reassuring.   Reevaluate as planned in clinic

## 2023-12-12 NOTE — TELEPHONE ENCOUNTER
Created telephone encounter. Spoke with Karen Cunningham relayed message per JOSÉ regarding stress test. Pt verbalized understanding.

## 2023-12-12 NOTE — PROGRESS NOTES
Patient arrived to stress lab for myoview stress test.  Patient was educated on procedure, all questions answered, and consent verified/obtained. Patient arrived to unit in a wheelchair with a knee and wrist brace on, pt states she has a partial tear in her right meniscus. Pt educated on treadmill vs lexiscan test, agreeable to proceed with lexiscan test related to knee issue.

## 2024-02-07 DIAGNOSIS — R10.32 LLQ ABDOMINAL PAIN: Primary | ICD-10-CM

## 2024-02-28 RX ORDER — CALCIUM CARBONATE/VITAMIN D3 500MG-5MCG
TABLET ORAL DAILY
COMMUNITY
Start: 2023-12-27

## 2024-02-28 RX ORDER — MULTIVIT WITH MINERALS/LUTEIN
500 TABLET ORAL DAILY
COMMUNITY

## 2024-02-28 RX ORDER — ZINC GLUCONATE 50 MG
50 TABLET ORAL DAILY
COMMUNITY

## 2024-02-28 RX ORDER — CALCIUM CARBONATE 500(1250)
500 TABLET ORAL DAILY
COMMUNITY
End: 2024-02-28

## 2024-03-06 ENCOUNTER — ANESTHESIA EVENT (OUTPATIENT)
Dept: ENDOSCOPY | Age: 64
End: 2024-03-06
Payer: MEDICARE

## 2024-03-06 NOTE — H&P
[facility]   Pre-operative History and Physical    Patient: Yamileth Rueda  : 1960  Acct#:     HISTORY OF PRESENT ILLNESS:    Indications: abdominal pain    63-year-old female with medical history of diabetes mellitus type 2, hypertension, hypothyroidism, diverticulosis, diverticulitis referred for abdominal pain. Patient reports left lower abdominal soreness of about 1 month duration. Pain began after noticing a bulge/knot in the LLQ which she pushed back in. Associated with constipation with hard stools daily and sensation of incomplete emptying on miralax and magnesium citrate. Denies nausea, vomiting, fever, chills,melena or hematochezia.         Report an ED visit to [facility] in 2024 for the abdominal pain where she had a CT abdomen performed and reportedly told it was normal.      Past Medical History:        Diagnosis Date    Diabetes mellitus (HCC)     Diverticulosis     Hypertension     Influenza A 2017    Lichen planus     Thyroid disease       Past Surgical History:        Procedure Laterality Date    ABDOMEN SURGERY      APPENDECTOMY      CHOLECYSTECTOMY      HYSTERECTOMY (CERVIX STATUS UNKNOWN)        Medications Prior to Admission:   No current facility-administered medications on file prior to encounter.     Current Outpatient Medications on File Prior to Encounter   Medication Sig Dispense Refill    zinc gluconate 50 MG tablet Take 1 tablet by mouth daily      CINNAMON PO Take by mouth      Ascorbic Acid (VITAMIN C) 250 MG tablet Take 2 tablets by mouth daily      OYSTER SHELL CALCIUM PLUS D 500-5 MG-MCG TABS Take by mouth daily      glipiZIDE (GLUCOTROL) 5 MG tablet Take 1 tablet by mouth 2 times daily (before meals)      aspirin 81 MG EC tablet Take 1 tablet by mouth daily 30 tablet 5    Probiotic Product (PROBIOTIC DAILY PO) Take by mouth      levothyroxine (SYNTHROID) 125 MCG tablet Take 1 tablet by mouth Daily      lisinopril (PRINIVIL;ZESTRIL) 20

## 2024-03-07 ENCOUNTER — ANESTHESIA (OUTPATIENT)
Dept: ENDOSCOPY | Age: 64
End: 2024-03-07
Payer: MEDICARE

## 2024-03-07 ENCOUNTER — HOSPITAL ENCOUNTER (OUTPATIENT)
Age: 64
Setting detail: OUTPATIENT SURGERY
Discharge: HOME | End: 2024-03-07
Attending: INTERNAL MEDICINE | Admitting: INTERNAL MEDICINE
Payer: MEDICARE

## 2024-03-07 VITALS
HEART RATE: 87 BPM | HEIGHT: 65 IN | WEIGHT: 180 LBS | DIASTOLIC BLOOD PRESSURE: 82 MMHG | OXYGEN SATURATION: 98 % | BODY MASS INDEX: 29.99 KG/M2 | SYSTOLIC BLOOD PRESSURE: 139 MMHG | TEMPERATURE: 96.9 F | RESPIRATION RATE: 16 BRPM

## 2024-03-07 DIAGNOSIS — R10.9 ABDOMINAL PAIN, UNSPECIFIED ABDOMINAL LOCATION: ICD-10-CM

## 2024-03-07 LAB
GLUCOSE BLD-MCNC: 146 MG/DL (ref 70–99)
GLUCOSE BLD-MCNC: 149 MG/DL (ref 70–99)
PERFORMED ON: ABNORMAL
PERFORMED ON: ABNORMAL

## 2024-03-07 PROCEDURE — 7100000011 HC PHASE II RECOVERY - ADDTL 15 MIN: Performed by: INTERNAL MEDICINE

## 2024-03-07 PROCEDURE — 2709999900 HC NON-CHARGEABLE SUPPLY: Performed by: INTERNAL MEDICINE

## 2024-03-07 PROCEDURE — 88305 TISSUE EXAM BY PATHOLOGIST: CPT

## 2024-03-07 PROCEDURE — 3700000000 HC ANESTHESIA ATTENDED CARE: Performed by: INTERNAL MEDICINE

## 2024-03-07 PROCEDURE — 2580000003 HC RX 258: Performed by: ANESTHESIOLOGY

## 2024-03-07 PROCEDURE — 3700000001 HC ADD 15 MINUTES (ANESTHESIA): Performed by: INTERNAL MEDICINE

## 2024-03-07 PROCEDURE — 2500000003 HC RX 250 WO HCPCS: Performed by: NURSE ANESTHETIST, CERTIFIED REGISTERED

## 2024-03-07 PROCEDURE — 6360000002 HC RX W HCPCS: Performed by: NURSE ANESTHETIST, CERTIFIED REGISTERED

## 2024-03-07 PROCEDURE — 7100000010 HC PHASE II RECOVERY - FIRST 15 MIN: Performed by: INTERNAL MEDICINE

## 2024-03-07 PROCEDURE — 3609010600 HC COLONOSCOPY POLYPECTOMY SNARE/COLD BIOPSY: Performed by: INTERNAL MEDICINE

## 2024-03-07 RX ORDER — LIDOCAINE HYDROCHLORIDE 20 MG/ML
INJECTION, SOLUTION INFILTRATION; PERINEURAL PRN
Status: DISCONTINUED | OUTPATIENT
Start: 2024-03-07 | End: 2024-03-07 | Stop reason: SDUPTHER

## 2024-03-07 RX ORDER — SODIUM CHLORIDE 9 MG/ML
INJECTION, SOLUTION INTRAVENOUS PRN
Status: DISCONTINUED | OUTPATIENT
Start: 2024-03-07 | End: 2024-03-07 | Stop reason: HOSPADM

## 2024-03-07 RX ORDER — SODIUM CHLORIDE, SODIUM LACTATE, POTASSIUM CHLORIDE, CALCIUM CHLORIDE 600; 310; 30; 20 MG/100ML; MG/100ML; MG/100ML; MG/100ML
INJECTION, SOLUTION INTRAVENOUS CONTINUOUS
Status: DISCONTINUED | OUTPATIENT
Start: 2024-03-07 | End: 2024-03-07 | Stop reason: HOSPADM

## 2024-03-07 RX ORDER — LIDOCAINE HYDROCHLORIDE 10 MG/ML
1 INJECTION, SOLUTION EPIDURAL; INFILTRATION; INTRACAUDAL; PERINEURAL
Status: DISCONTINUED | OUTPATIENT
Start: 2024-03-07 | End: 2024-03-07 | Stop reason: HOSPADM

## 2024-03-07 RX ORDER — PROPOFOL 10 MG/ML
INJECTION, EMULSION INTRAVENOUS PRN
Status: DISCONTINUED | OUTPATIENT
Start: 2024-03-07 | End: 2024-03-07 | Stop reason: SDUPTHER

## 2024-03-07 RX ORDER — SODIUM CHLORIDE 0.9 % (FLUSH) 0.9 %
5-40 SYRINGE (ML) INJECTION PRN
Status: DISCONTINUED | OUTPATIENT
Start: 2024-03-07 | End: 2024-03-07 | Stop reason: HOSPADM

## 2024-03-07 RX ORDER — SODIUM CHLORIDE 0.9 % (FLUSH) 0.9 %
5-40 SYRINGE (ML) INJECTION EVERY 12 HOURS SCHEDULED
Status: DISCONTINUED | OUTPATIENT
Start: 2024-03-07 | End: 2024-03-07 | Stop reason: HOSPADM

## 2024-03-07 RX ADMIN — PROPOFOL 80 MG: 10 INJECTION, EMULSION INTRAVENOUS at 10:45

## 2024-03-07 RX ADMIN — PROPOFOL 80 MG: 10 INJECTION, EMULSION INTRAVENOUS at 10:49

## 2024-03-07 RX ADMIN — LIDOCAINE HYDROCHLORIDE 60 MG: 20 INJECTION, SOLUTION INFILTRATION; PERINEURAL at 10:39

## 2024-03-07 RX ADMIN — SODIUM CHLORIDE, POTASSIUM CHLORIDE, SODIUM LACTATE AND CALCIUM CHLORIDE: 600; 310; 30; 20 INJECTION, SOLUTION INTRAVENOUS at 10:36

## 2024-03-07 RX ADMIN — PROPOFOL 80 MG: 10 INJECTION, EMULSION INTRAVENOUS at 10:39

## 2024-03-07 ASSESSMENT — ENCOUNTER SYMPTOMS: SHORTNESS OF BREATH: 1

## 2024-03-07 ASSESSMENT — PAIN - FUNCTIONAL ASSESSMENT: PAIN_FUNCTIONAL_ASSESSMENT: NONE - DENIES PAIN

## 2024-03-07 ASSESSMENT — PAIN SCALES - GENERAL
PAINLEVEL_OUTOF10: 0
PAINLEVEL_OUTOF10: 0

## 2024-03-07 NOTE — OP NOTE
[address] [place] [address],  [address]  [place] [address]  Phone: [phone]   Fax:[phone]  [address] [place] [address] ,  [address]  [place] [address]  Phone: [phone]   Fax:[phone]      Colonoscopy Procedure Note    Patient: Yamileth Rueda  : 1960    Procedure: Colonoscopy with polypectomy (cold snare)    Date:  3/7/2024     Endoscopist:  Bunny Bee MD    Referring Physician:  Bill Gleason MD    Preoperative Diagnosis:  Abdominal pain, unspecified abdominal location [R10.9]    Postoperative Diagnosis: descending colon polyp, sigmoid diverticulosis, internal hemorrhoids    Anesthesia: Anesthesia: MAC  Sedation: Propofol per anesthesia  Start Time: 10:41  Stop Time: 10:55  ASA Class: 2  Mallampati: II (soft palate, uvula, fauces visible)    Indications: This is a 63 y.o. year old female with medical history of diabetes mellitus type 2, hypertension, hypothyroidism, diverticulosis, diverticulitis referred for abdominal pain     Procedure Details  Informed consent was obtained for the procedure, including sedation.  Risks of perforation, hemorrhage, adverse drug reaction and aspiration were discussed. The patient was placed in the left lateral decubitus position.  Based on the pre-procedure assessment, including review of the patient's medical history, medications, allergies, and review of systems, she had been deemed to be an appropriate candidate for above IV sedation; she was therefore sedated and monitored continuously with ECG tracing, pulse oximetry, blood pressure monitoring, and direct observations. Rectal examination was performed.  There were no external hemorrhoids, fissures or skin tags.  The colonoscope was inserted into the rectum and advanced under direct vision to the terminal ileum.  The right colon was examined twice as this increases polyp detection especially if other right colon polyps, older age, male, or wright syndrome. The quality of the colonic preparation was

## 2024-03-07 NOTE — PROGRESS NOTES
.1.  Do not eat or drink anything after 12 midnight prior to surgery.  This includes no water, chewing gum or mints, except for bowel prep complete per MD.  2.  Take the following pills with a small sip of water on the morning of surgery.  3.  Aspirin, Ibuprofen, Advil, Naproxen, Vitamin E and other Anti-inflammatory products should be stopped for 5 days before surgery or as directed by your physician.  4.  Check with your doctor regarding stopping Plavix, Coumadin, Lovenox, Fragmin or other blood thinners.  5.  Do not smoke and do not drink alcoholic beverages 24 hours prior to surgery.  This includes NA Beer.  6.  You may brush your teeth and gargle the morning of surgery.  DO NOT SWALLOW WATER.  7.  You MUST make arrangements for a responsible adult to take you home after your surgery.  You will not be allowed to leave alone or drive yourself home.  It is strongly suggested someone stay with you the first 24 hours.  Your surgery will be cancelled if you do not have a ride home.  8.  A parent/legal guardian must accompany a child scheduled for surgery and plan to stay at the hospital until the child is discharged.  Please do not bring other children with you.  9.  Please wear simple, loose fitting clothing to the hospital.  Do not bring valuables ( money, credit cards, checkbooks, etc.)  Do not wear any makeup (including no eye makeup) or nail polish on your fingers or toes.  10.  Do not wear any jewelry or piercing on the day of surgery.  All body piercing jewelry must be removed.  11.  If you have dentures, they will be removed before going to the OR; we will provide you a container.  If you wear contact lenses or glasses, they will be removed; please bring a case for them.  12.  Notify your Surgeon if you develop any illness between now and surgery time; cough, cold, fever, sore throat, nausea, vomiting, etc.  Please notify your surgeon if you experience dizziness, shortness of breath or blurred vision between 
Transfer of care to Claire GAN RN.   
phase.  Interventions- orient the patient to the environment, especially the location of the bathroom; provide treaded socks/non-skid footwear; demonstrate and teach back use of the nurse's call system; instruct the patient to call for help before getting out of bed; lock all movable equipment before transferring patient; keep bed in lowest position possible.

## 2024-03-07 NOTE — DISCHARGE INSTRUCTIONS
PATIENT INSTRUCTIONS  POST-SEDATION    Yamileth Rueda          IMMEDIATELY FOLLOWING PROCEDURE:    Do not drive or operate machinery for the first twenty four hours after surgery.     Do not make any important decisions for twenty four hours after surgery or while taking narcotic pain medications or sedatives.     You should NOT BE LEFT UNATTENDED OR ALONE. A responsible adult should be with you for the rest of the day of your procedure and also during the night for your protection and safety.    You may experience some light headedness. Rest at home with activity as tolerated. You may not need to go to bed, but it is important to rest for the next 24 hours. You should not engage in athletic sports such as basketball, volleyball, jogging, skating, or activities requiring refined motor skills for 24 hours.   If you develop intractable nausea and vomiting or a severe headache please notify your doctor immediately.   You are not expected to have any fever, but if you feel warm, take your temperature. If you have a fever 101 degrees or higher, call your doctor.     If you have had an Endoscopy:   *Eat lightly for your first meal and gradually resume your normal / prescribed diet. DO NOT eat or drink until your gag reflex returns.   *If you have a sore throat you may use lozenges, or salt water gargles.   *If you have had a colonoscopy, do not expect a normal bowel movement for approximately three days due to the cleansing of the large intestine prior to colonoscopy.    ONCE YOU ARE HOME, IF YOU SHOULD HAVE:  Difficulty in breathing, persistent nausea or vomiting, bleeding you feel is excessive, or pain that is unusual, increased abdominal bloating, or any swelling, fever / chills, call your physician. If you cannot contact your physician, but feel that your signs and symptoms need a physician's attention, go to the Emergency Department.      FOLLOW-UP:    Please follow up with Dr. Gleason as scheduled or

## 2024-03-07 NOTE — ANESTHESIA PRE PROCEDURE
04/18/2022 04:18 PM    K 4.2 04/18/2022 04:18 PM     04/18/2022 04:18 PM    CO2 24 04/18/2022 04:18 PM    BUN 8 04/18/2022 04:18 PM    CREATININE 0.8 04/18/2022 04:18 PM    GFRAA >60 04/18/2022 04:18 PM    LABGLOM >60 04/18/2022 04:18 PM    GLUCOSE 186 04/18/2022 04:18 PM    CALCIUM 9.8 04/18/2022 04:18 PM       POC Tests: No results for input(s): \"POCGLU\", \"POCNA\", \"POCK\", \"POCCL\", \"POCBUN\", \"POCHEMO\", \"POCHCT\" in the last 72 hours.    Coags:   Lab Results   Component Value Date/Time    PROTIME 11.6 04/18/2022 04:18 PM    INR 1.03 04/18/2022 04:18 PM    APTT 33.6 04/18/2022 04:18 PM       HCG (If Applicable): No results found for: \"PREGTESTUR\", \"PREGSERUM\", \"HCG\", \"HCGQUANT\"     ABGs: No results found for: \"PHART\", \"PO2ART\", \"IGJ2UFG\", \"IHO5WGI\", \"BEART\", \"E8DSZKAQ\"     Type & Screen (If Applicable):  No results found for: \"LABABO\", \"LABRH\"    Drug/Infectious Status (If Applicable):  No results found for: \"HIV\", \"HEPCAB\"    COVID-19 Screening (If Applicable): No results found for: \"COVID19\"        Anesthesia Evaluation  Patient summary reviewed and Nursing notes reviewed   no history of anesthetic complications:   Airway: Mallampati: II     Neck ROM: full     Dental:          Pulmonary:Negative Pulmonary ROS and normal exam    (+)   shortness of breath:                                    Cardiovascular:Negative CV ROS    (+) hypertension:                  Neuro/Psych:   Negative Neuro/Psych ROS              GI/Hepatic/Renal: Neg GI/Hepatic/Renal ROS       (-) hiatal hernia and GERD       Endo/Other: Negative Endo/Other ROS   (+) Diabetes.                 Abdominal:             Vascular:          Other Findings:       Anesthesia Plan      general     ASA 2     (I discussed with the patient the risks and benefits of PIV, general anesthesia, IV Narcotics, PACU.  All questions were answered the patient agrees with the plan and wishes to proceed.  )  Induction: intravenous.                        David

## 2024-03-07 NOTE — ANESTHESIA POSTPROCEDURE EVALUATION
Department of Anesthesiology  Postprocedure Note    Patient: Yamileth Rueda  MRN: [id number]  YOB: 1960  Date of evaluation: 3/7/2024    Procedure Summary       Date: 03/07/24 Room / Location: [address]    Anesthesia Start: 1036 Anesthesia Stop: 1058    Procedure: COLONOSCOPY POLYPECTOMY SNARE/COLD BIOPSY Diagnosis:       Abdominal pain, unspecified abdominal location      (Abdominal pain, unspecified abdominal location [R10.9])    Surgeons: Bunny Bee MD Responsible Provider: David Ng MD    Anesthesia Type: general ASA Status: 2            Anesthesia Type: No value filed.    Abisai Phase I: Abisai Score: 10    Abisai Phase II: Abisai Score: 10    Anesthesia Post Evaluation    Comments: Postoperative Anesthesia Note    Name:    Yamileth Rueda  MRN:      [id number]    Patient Vitals in the past 12 hrs:  03/07/24 1129, BP:139/82, Pulse:87, Resp:16, SpO2:98 %  03/07/24 1100, BP:104/68, Temp:96.9 °F (36.1 °C), Temp src:Temporal, Pulse:89, Resp:16, SpO2:97 %  03/07/24 1016, BP:137/88, Temp:(!) 96 °F (35.6 °C), Temp src:Infrared, Pulse:84, Resp:16, SpO2:95 %     LABS:    CBC  Lab Results       Component                Value               Date/Time                  WBC                      7.9                 04/18/2022 04:18 PM        HGB                      15.6                04/18/2022 04:18 PM        HCT                      48.1 (H)            04/18/2022 04:18 PM        PLT                      329                 04/18/2022 04:18 PM   RENAL  Lab Results       Component                Value               Date/Time                  NA                       139                 04/18/2022 04:18 PM        K                        4.2                 04/18/2022 04:18 PM        CL                       101                 04/18/2022 04:18 PM        CO2                      24                  04/18/2022 04:18 PM        BUN                      8

## 2024-03-28 ENCOUNTER — TELEPHONE (OUTPATIENT)
Dept: CARDIOLOGY CLINIC | Age: 64
End: 2024-03-28

## 2024-03-28 ENCOUNTER — OFFICE VISIT (OUTPATIENT)
Dept: CARDIOLOGY CLINIC | Age: 64
End: 2024-03-28
Payer: MEDICARE

## 2024-03-28 ENCOUNTER — HOSPITAL ENCOUNTER (OUTPATIENT)
Age: 64
Discharge: HOME | End: 2024-03-28
Payer: MEDICARE

## 2024-03-28 VITALS
BODY MASS INDEX: 30.06 KG/M2 | HEIGHT: 65 IN | WEIGHT: 180.4 LBS | SYSTOLIC BLOOD PRESSURE: 108 MMHG | HEART RATE: 79 BPM | DIASTOLIC BLOOD PRESSURE: 76 MMHG | OXYGEN SATURATION: 98 %

## 2024-03-28 DIAGNOSIS — I10 ESSENTIAL HYPERTENSION: ICD-10-CM

## 2024-03-28 DIAGNOSIS — I25.10 CORONARY ARTERY CALCIFICATION SEEN ON CAT SCAN: Primary | ICD-10-CM

## 2024-03-28 DIAGNOSIS — I25.10 CORONARY ARTERY CALCIFICATION SEEN ON CAT SCAN: ICD-10-CM

## 2024-03-28 DIAGNOSIS — R73.09 ELEVATED GLUCOSE LEVEL: ICD-10-CM

## 2024-03-28 DIAGNOSIS — I10 PRIMARY HYPERTENSION: ICD-10-CM

## 2024-03-28 LAB
ALBUMIN SERPL-MCNC: 4 G/DL (ref 3.4–5)
ALBUMIN/GLOB SERPL: 1.4 {RATIO} (ref 1.1–2.2)
ALP SERPL-CCNC: 144 U/L (ref 40–129)
ALT SERPL-CCNC: 15 U/L (ref 10–40)
ANION GAP SERPL CALCULATED.3IONS-SCNC: 9 MMOL/L (ref 3–16)
AST SERPL-CCNC: 29 U/L (ref 15–37)
BASOPHILS # BLD: 0.1 K/UL (ref 0–0.2)
BASOPHILS NFR BLD: 1 %
BILIRUB SERPL-MCNC: 0.9 MG/DL (ref 0–1)
BUN SERPL-MCNC: 8 MG/DL (ref 7–20)
CALCIUM SERPL-MCNC: 9.8 MG/DL (ref 8.3–10.6)
CHLORIDE SERPL-SCNC: 101 MMOL/L (ref 99–110)
CHOLEST SERPL-MCNC: 227 MG/DL (ref 0–199)
CO2 SERPL-SCNC: 28 MMOL/L (ref 21–32)
CREAT SERPL-MCNC: 0.7 MG/DL (ref 0.6–1.2)
DEPRECATED RDW RBC AUTO: 14.5 % (ref 12.4–15.4)
EOSINOPHIL # BLD: 0.2 K/UL (ref 0–0.6)
EOSINOPHIL NFR BLD: 2.4 %
GFR SERPLBLD CREATININE-BSD FMLA CKD-EPI: >90 ML/MIN/{1.73_M2}
GLUCOSE SERPL-MCNC: 138 MG/DL (ref 70–99)
HCT VFR BLD AUTO: 46.2 % (ref 36–48)
HDLC SERPL-MCNC: 39 MG/DL (ref 40–60)
HGB BLD-MCNC: 15.3 G/DL (ref 12–16)
LDL CHOLESTEROL CALCULATED: 148 MG/DL
LYMPHOCYTES # BLD: 2.5 K/UL (ref 1–5.1)
LYMPHOCYTES NFR BLD: 35.7 %
MCH RBC QN AUTO: 26.5 PG (ref 26–34)
MCHC RBC AUTO-ENTMCNC: 33.2 G/DL (ref 31–36)
MCV RBC AUTO: 79.9 FL (ref 80–100)
MONOCYTES # BLD: 0.5 K/UL (ref 0–1.3)
MONOCYTES NFR BLD: 7.7 %
NEUTROPHILS # BLD: 3.8 K/UL (ref 1.7–7.7)
NEUTROPHILS NFR BLD: 53.2 %
PLATELET # BLD AUTO: 302 K/UL (ref 135–450)
PMV BLD AUTO: 7.7 FL (ref 5–10.5)
POTASSIUM SERPL-SCNC: 4.2 MMOL/L (ref 3.5–5.1)
PROT SERPL-MCNC: 6.9 G/DL (ref 6.4–8.2)
RBC # BLD AUTO: 5.78 M/UL (ref 4–5.2)
SODIUM SERPL-SCNC: 138 MMOL/L (ref 136–145)
T4 FREE SERPL-MCNC: 1.9 NG/DL (ref 0.9–1.8)
TRIGL SERPL-MCNC: 200 MG/DL (ref 0–150)
TSH SERPL DL<=0.005 MIU/L-ACNC: <0.01 UIU/ML (ref 0.27–4.2)
VLDLC SERPL CALC-MCNC: 40 MG/DL
WBC # BLD AUTO: 7.1 K/UL (ref 4–11)

## 2024-03-28 PROCEDURE — 80053 COMPREHEN METABOLIC PANEL: CPT

## 2024-03-28 PROCEDURE — G8484 FLU IMMUNIZE NO ADMIN: HCPCS | Performed by: INTERNAL MEDICINE

## 2024-03-28 PROCEDURE — 83036 HEMOGLOBIN GLYCOSYLATED A1C: CPT

## 2024-03-28 PROCEDURE — G8417 CALC BMI ABV UP PARAM F/U: HCPCS | Performed by: INTERNAL MEDICINE

## 2024-03-28 PROCEDURE — 3074F SYST BP LT 130 MM HG: CPT | Performed by: INTERNAL MEDICINE

## 2024-03-28 PROCEDURE — 84439 ASSAY OF FREE THYROXINE: CPT

## 2024-03-28 PROCEDURE — 36415 COLL VENOUS BLD VENIPUNCTURE: CPT

## 2024-03-28 PROCEDURE — 80061 LIPID PANEL: CPT

## 2024-03-28 PROCEDURE — 1036F TOBACCO NON-USER: CPT | Performed by: INTERNAL MEDICINE

## 2024-03-28 PROCEDURE — 99214 OFFICE O/P EST MOD 30 MIN: CPT | Performed by: INTERNAL MEDICINE

## 2024-03-28 PROCEDURE — 3078F DIAST BP <80 MM HG: CPT | Performed by: INTERNAL MEDICINE

## 2024-03-28 PROCEDURE — G8427 DOCREV CUR MEDS BY ELIG CLIN: HCPCS | Performed by: INTERNAL MEDICINE

## 2024-03-28 PROCEDURE — 84443 ASSAY THYROID STIM HORMONE: CPT

## 2024-03-28 PROCEDURE — 85025 COMPLETE CBC W/AUTO DIFF WBC: CPT

## 2024-03-28 PROCEDURE — 3017F COLORECTAL CA SCREEN DOC REV: CPT | Performed by: INTERNAL MEDICINE

## 2024-03-28 RX ORDER — POLYETHYLENE GLYCOL 3350 17 G/17G
POWDER, FOR SOLUTION ORAL
COMMUNITY

## 2024-03-28 NOTE — TELEPHONE ENCOUNTER
Created telephone encounter. Spoke with Yamileth relayed message per JOSÉ regarding labs. Pt verbalized understanding.

## 2024-03-28 NOTE — TELEPHONE ENCOUNTER
----- Message from Drew Smiley MD sent at 3/28/2024  4:08 PM EDT -----  Blood counts appear normal, will follow-up remaining pending labs.  No changes at this time.

## 2024-03-28 NOTE — PROGRESS NOTES
CARDIOLOGY FOLLOW UP        Patient Name: Yamileth Rueda  Primary Care physician: Bill Gleason MD    Reason for Referral/Chief Complaint: Yamileth Rueda is a 63 y.o. patient who is referred to cardiology clinic today for evaluation and treatment of hypertension and chest pain.     History of Present Illness:     Yamileth Rueda is a 63-year-old woman with a prior medical history notable for diabetes mellitus, hypertension and hypothyroidism, who presents today for reevaluation of chest pain.    In the interm echocardiogram 8/10/23 showed EF 55%, mild MR and TR.  She was reluctant to schedule stress testing so coronary calcium score subsequently pursued. Her Calcium Score was 63, 80th percentile for her age.  Aspirin and statin were recommended.    LOV 11/28/23 at which time it was noted she had significant episode of chest heaviness and sharp discomfort while at [employer] in the setting of emotional upset.  ER visit was unremarkable.  We again discussed stress testing and she was agreeable.    In the interim stress test 12/12/23 showed no evidence of myocardial ischemia or scar.  EF > 65%.     Today she reports feeling fairly well.  Life stressors have gotten better.  She had 1 son passed away this past year.  Her other son she states she has got a job and she is happy for that.  Her cat recently passed away.  She is hopeful to have her knee surgery this year.  She is dealing with all this fairly well.  She is compliant with her aspirin therapy.  She has been resistant to statins in the past.  She has preferred vinegar/garlic remedies to prescription medication for her lipid management.  No lipids available for review today.  Denies recurrence of chest pain, shortness of breath,  palpitations, near-syncope or syncope. Occasional dizziness with position changes.  Denies paroxysmal nocturnal dyspnea, orthopnea, increasing lower extremity edema or weight gain.  Sleeps on 1 pillow at night.     Former

## 2024-03-28 NOTE — PATIENT INSTRUCTIONS
No change in medications today  No cardiac testing warranted at this time  LABS today - tsh, A1C, Lipids, cmp and cbc   We will call you with the results

## 2024-03-29 ENCOUNTER — TELEPHONE (OUTPATIENT)
Dept: CARDIOLOGY CLINIC | Age: 64
End: 2024-03-29

## 2024-03-29 LAB
EST. AVERAGE GLUCOSE BLD GHB EST-MCNC: 174.3 MG/DL
HBA1C MFR BLD: 7.7 %

## 2024-03-29 RX ORDER — PRAVASTATIN SODIUM 20 MG
20 TABLET ORAL DAILY
Qty: 90 TABLET | Refills: 1 | Status: SHIPPED | OUTPATIENT
Start: 2024-03-29

## 2024-03-29 NOTE — TELEPHONE ENCOUNTER
Spoke with pt relayed message per [facility] message.Pt would like pravastatin  sent to [facility].

## 2024-03-29 NOTE — TELEPHONE ENCOUNTER
----- Message from Drew Smiley MD sent at 3/29/2024  1:51 PM EDT -----  Labs returned.  Thyroid studies are abnormal, A1c is 7.7 above goal 7.0, electrolytes and kidney function appear stable.  I would review these at upcoming PCP appointment with Dr. VIRGEN.  Lipids are quite elevated with .  In the setting of hardening of the coronary arteries by CAT scan, I do recommend statin therapy.  Understanding her reservations, I would start with very low-dose, pravastatin 20 mg nightly would be a good start if she is willing after she is reviewed literature we gave her in clinic.

## 2024-04-22 ENCOUNTER — APPOINTMENT (OUTPATIENT)
Dept: GENERAL RADIOLOGY | Age: 64
End: 2024-04-22
Payer: MEDICARE

## 2024-04-22 ENCOUNTER — HOSPITAL ENCOUNTER (EMERGENCY)
Age: 64
Discharge: HOME | End: 2024-04-22
Payer: MEDICARE

## 2024-04-22 VITALS
HEIGHT: 65 IN | OXYGEN SATURATION: 100 % | WEIGHT: 180 LBS | SYSTOLIC BLOOD PRESSURE: 119 MMHG | TEMPERATURE: 98 F | DIASTOLIC BLOOD PRESSURE: 98 MMHG | HEART RATE: 98 BPM | BODY MASS INDEX: 29.99 KG/M2 | RESPIRATION RATE: 18 BRPM

## 2024-04-22 DIAGNOSIS — M17.11 OSTEOARTHRITIS OF RIGHT KNEE, UNSPECIFIED OSTEOARTHRITIS TYPE: ICD-10-CM

## 2024-04-22 DIAGNOSIS — M25.561 RIGHT KNEE PAIN, UNSPECIFIED CHRONICITY: Primary | ICD-10-CM

## 2024-04-22 PROCEDURE — 99283 EMERGENCY DEPT VISIT LOW MDM: CPT

## 2024-04-22 PROCEDURE — 73560 X-RAY EXAM OF KNEE 1 OR 2: CPT

## 2024-04-22 ASSESSMENT — LIFESTYLE VARIABLES
HOW MANY STANDARD DRINKS CONTAINING ALCOHOL DO YOU HAVE ON A TYPICAL DAY: PATIENT DOES NOT DRINK
HOW OFTEN DO YOU HAVE A DRINK CONTAINING ALCOHOL: NEVER

## 2024-04-22 ASSESSMENT — PAIN DESCRIPTION - ORIENTATION: ORIENTATION: RIGHT

## 2024-04-22 ASSESSMENT — PAIN DESCRIPTION - ONSET: ONSET: ON-GOING

## 2024-04-22 ASSESSMENT — PAIN - FUNCTIONAL ASSESSMENT
PAIN_FUNCTIONAL_ASSESSMENT: 0-10
PAIN_FUNCTIONAL_ASSESSMENT: PREVENTS OR INTERFERES WITH MANY ACTIVE NOT PASSIVE ACTIVITIES

## 2024-04-22 ASSESSMENT — PAIN DESCRIPTION - FREQUENCY: FREQUENCY: CONTINUOUS

## 2024-04-22 ASSESSMENT — PAIN DESCRIPTION - PAIN TYPE: TYPE: ACUTE PAIN

## 2024-04-22 ASSESSMENT — PAIN DESCRIPTION - LOCATION: LOCATION: KNEE

## 2024-04-22 NOTE — ED PROVIDER NOTES
ORDERING SYSTEM PROVIDED HISTORY: right knee pain after walking TECHNOLOGIST PROVIDED HISTORY: Reason for exam:->right knee pain after walking Reason for Exam: right knee pain, no known injury FINDINGS: Moderate joint space narrowing is seen laterally.  Moderate spurring is seen laterally.  Mild spurring is seen medially.  Moderate spurring is seen in patellofemoral joint.  Tiny joint effusion is seen.  No acute fracture     No acute osseous abnormality. Degenerative change, greatest laterally, similar compared to 2022      No results found.    PROCEDURES   Unless otherwise noted below, none     Procedures    CRITICAL CARE TIME (.cctime)   0    PAST MEDICAL HISTORY      has a past medical history of Diabetes mellitus (HCC), Diverticulosis, Hypertension, Influenza A (03/23/2017), Lichen planus, and Thyroid disease.     EMERGENCY DEPARTMENT COURSE and DIFFERENTIAL DIAGNOSIS/MDM:   Vitals:    Vitals:    04/22/24 1224   BP: (!) 119/98   Pulse: 98   Resp: 18   Temp: 98 °F (36.7 °C)   TempSrc: Oral   SpO2: 100%   Weight: 81.6 kg (180 lb)   Height: 1.651 m (5' 5\")       Patient was given the following medications:  Medications - No data to display          Is this patient to be included in the SEP-1 Core Measure due to severe sepsis or septic shock?   No   Exclusion criteria - the patient is NOT to be included for SEP-1 Core Measure due to:  Infection is not suspected    Chronic Conditions affecting care:    has a past medical history of Diabetes mellitus (HCC), Diverticulosis, Hypertension, Influenza A (03/23/2017), Lichen planus, and Thyroid disease.    CONSULTS: (Who and What was discussed)  None        Records Reviewed (External and Source)     CC/HPI Summary, DDx, ED Course, and Reassessment:       Patient presented to the ER for evaluation of right knee pain that started over the weekend states she had been walking more and was walking on uneven ground over the weekend and now having increased pain.  States her knee

## 2024-04-22 NOTE — DISCHARGE INSTRUCTIONS
Rest.  Ice.  Elevate.  Wear knee brace.  Continue ibuprofen as needed for pain.  Follow-up with your orthopedist.      Right knee xray results:  FINDINGS:   Moderate joint space narrowing is seen laterally.  Moderate spurring is seen   laterally.  Mild spurring is seen medially.  Moderate spurring is seen in   patellofemoral joint.  Tiny joint effusion is seen.  No acute fracture

## 2024-04-22 NOTE — ED TRIAGE NOTES
C/o right knee pain since Saturday while walking. Reports history of a torn meniscus. Took Ibuprofen on Saturday, none since at time. Ambulatory to the room from the lobby with a limp. Edema noted. PMS intact.

## 2024-06-27 ENCOUNTER — HOSPITAL ENCOUNTER (EMERGENCY)
Age: 64
Discharge: HOME | End: 2024-06-27
Attending: STUDENT IN AN ORGANIZED HEALTH CARE EDUCATION/TRAINING PROGRAM
Payer: MEDICARE

## 2024-06-27 ENCOUNTER — APPOINTMENT (OUTPATIENT)
Dept: GENERAL RADIOLOGY | Age: 64
End: 2024-06-27
Payer: MEDICARE

## 2024-06-27 VITALS
WEIGHT: 182 LBS | BODY MASS INDEX: 30.32 KG/M2 | OXYGEN SATURATION: 99 % | HEART RATE: 66 BPM | DIASTOLIC BLOOD PRESSURE: 83 MMHG | HEIGHT: 65 IN | RESPIRATION RATE: 16 BRPM | SYSTOLIC BLOOD PRESSURE: 122 MMHG | TEMPERATURE: 97.8 F

## 2024-06-27 DIAGNOSIS — R07.9 CHEST PAIN, UNSPECIFIED TYPE: Primary | ICD-10-CM

## 2024-06-27 LAB
ALBUMIN SERPL-MCNC: 3.8 G/DL (ref 3.4–5)
ALBUMIN/GLOB SERPL: 1.5 {RATIO} (ref 1.1–2.2)
ALP SERPL-CCNC: 123 U/L (ref 40–129)
ALT SERPL-CCNC: 18 U/L (ref 10–40)
ANION GAP SERPL CALCULATED.3IONS-SCNC: 8 MMOL/L (ref 3–16)
AST SERPL-CCNC: 22 U/L (ref 15–37)
BASOPHILS # BLD: 0.1 K/UL (ref 0–0.2)
BASOPHILS NFR BLD: 1.4 %
BILIRUB SERPL-MCNC: 0.9 MG/DL (ref 0–1)
BUN SERPL-MCNC: 11 MG/DL (ref 7–20)
CALCIUM SERPL-MCNC: 9.2 MG/DL (ref 8.3–10.6)
CHLORIDE SERPL-SCNC: 100 MMOL/L (ref 99–110)
CO2 SERPL-SCNC: 28 MMOL/L (ref 21–32)
CREAT SERPL-MCNC: 0.7 MG/DL (ref 0.6–1.2)
DEPRECATED RDW RBC AUTO: 14.3 % (ref 12.4–15.4)
EKG ATRIAL RATE: 70 BPM
EKG DIAGNOSIS: NORMAL
EKG P AXIS: 11 DEGREES
EKG P-R INTERVAL: 134 MS
EKG Q-T INTERVAL: 388 MS
EKG QRS DURATION: 72 MS
EKG QTC CALCULATION (BAZETT): 419 MS
EKG R AXIS: 23 DEGREES
EKG T AXIS: 34 DEGREES
EKG VENTRICULAR RATE: 70 BPM
EOSINOPHIL # BLD: 0.3 K/UL (ref 0–0.6)
EOSINOPHIL NFR BLD: 4.7 %
GFR SERPLBLD CREATININE-BSD FMLA CKD-EPI: >90 ML/MIN/{1.73_M2}
GLUCOSE SERPL-MCNC: 226 MG/DL (ref 70–99)
HCT VFR BLD AUTO: 43.6 % (ref 36–48)
HGB BLD-MCNC: 14.7 G/DL (ref 12–16)
LYMPHOCYTES # BLD: 2.6 K/UL (ref 1–5.1)
LYMPHOCYTES NFR BLD: 42.5 %
MCH RBC QN AUTO: 27.1 PG (ref 26–34)
MCHC RBC AUTO-ENTMCNC: 33.7 G/DL (ref 31–36)
MCV RBC AUTO: 80.3 FL (ref 80–100)
MONOCYTES # BLD: 0.6 K/UL (ref 0–1.3)
MONOCYTES NFR BLD: 9.2 %
NEUTROPHILS # BLD: 2.6 K/UL (ref 1.7–7.7)
NEUTROPHILS NFR BLD: 42.2 %
NT-PROBNP SERPL-MCNC: 137 PG/ML (ref 0–124)
PLATELET # BLD AUTO: 257 K/UL (ref 135–450)
PMV BLD AUTO: 7.7 FL (ref 5–10.5)
POTASSIUM SERPL-SCNC: 3.9 MMOL/L (ref 3.5–5.1)
PROT SERPL-MCNC: 6.3 G/DL (ref 6.4–8.2)
RBC # BLD AUTO: 5.43 M/UL (ref 4–5.2)
SODIUM SERPL-SCNC: 136 MMOL/L (ref 136–145)
TROPONIN, HIGH SENSITIVITY: 8 NG/L (ref 0–14)
TROPONIN, HIGH SENSITIVITY: 9 NG/L (ref 0–14)
WBC # BLD AUTO: 6.2 K/UL (ref 4–11)

## 2024-06-27 PROCEDURE — 80053 COMPREHEN METABOLIC PANEL: CPT

## 2024-06-27 PROCEDURE — 93010 ELECTROCARDIOGRAM REPORT: CPT | Performed by: INTERNAL MEDICINE

## 2024-06-27 PROCEDURE — 84484 ASSAY OF TROPONIN QUANT: CPT

## 2024-06-27 PROCEDURE — 99285 EMERGENCY DEPT VISIT HI MDM: CPT

## 2024-06-27 PROCEDURE — 93005 ELECTROCARDIOGRAM TRACING: CPT | Performed by: STUDENT IN AN ORGANIZED HEALTH CARE EDUCATION/TRAINING PROGRAM

## 2024-06-27 PROCEDURE — 85025 COMPLETE CBC W/AUTO DIFF WBC: CPT

## 2024-06-27 PROCEDURE — 36415 COLL VENOUS BLD VENIPUNCTURE: CPT

## 2024-06-27 PROCEDURE — 83880 ASSAY OF NATRIURETIC PEPTIDE: CPT

## 2024-06-27 PROCEDURE — 71046 X-RAY EXAM CHEST 2 VIEWS: CPT

## 2024-06-27 ASSESSMENT — HEART SCORE: ECG: NORMAL

## 2024-06-27 NOTE — DISCHARGE INSTRUCTIONS
You were seen today for chest pain.  Your initial workup in the emergency department was reassuring.  However, as we discussed I do have concern that this pain may be related to your heart.  I did offer you admission but you declined, we consider this and informed discharge.  Please return to the emergency department immediately if you have any further chest pain or if you change her mind about admission

## 2024-06-27 NOTE — ED PROVIDER NOTES
absence of a cardiologist.  normal sinus rhythm with a rate of 70  Axis is   Normal  QTc is  within an acceptable range  Intervals and Durations are unremarkable.      ST Segments: no acute change  No significant change from prior EKG dated 23      RADIOLOGY  Non-plain film images such as CT, Ultrasound and MRI are read by the radiologist. Plain radiographic images are visualized and preliminarily interpreted by the ED Provider with the below findings:    Chest x-ray on my independent interpretation shows no evidence of pneumonia, pneumothorax, pleural effusion, pulmonary edema    Interpretation per the Radiologist below, if available at the time of this note:    XR CHEST (2 VW)   Final Result   There is no evidence of acute chest disease.             EMERGENCY DEPARTMENT COURSE and DIFFERENTIAL DIAGNOSIS/MDM:   Patient seen and evaluated. Old records reviewed and pertinent information included in HPI. Labs and imaging reviewed and results discussed with patient.      Overall well appearing patient, in no acute distress, presenting for chest pain.  History obtained from patient.   Physical exam unremarkable.     Patient's pertinent external medical records: Records Reviewed : None    Chronic Medical Conditions that may contribute to presentation today:  has a past medical history of Diabetes mellitus (HCC), Diverticulosis, Hypertension, Influenza A (2017), Lichen planus, and Thyroid disease.     Social Determinants affecting Dx or Tx:    Social History     Socioeconomic History    Marital status:      Spouse name: Not on file    Number of children: Not on file    Years of education: Not on file    Highest education level: Not on file   Occupational History    Not on file   Tobacco Use    Smoking status: Former     Current packs/day: 0.00     Average packs/day: 0.3 packs/day for 22.0 years (5.5 ttl pk-yrs)     Types: Cigarettes     Start date:      Quit date:      Years since quittin.5

## 2024-07-10 ENCOUNTER — HOSPITAL ENCOUNTER (EMERGENCY)
Age: 64
Discharge: HOME | End: 2024-07-10
Attending: EMERGENCY MEDICINE
Payer: MEDICARE

## 2024-07-10 ENCOUNTER — APPOINTMENT (OUTPATIENT)
Dept: GENERAL RADIOLOGY | Age: 64
End: 2024-07-10
Payer: MEDICARE

## 2024-07-10 VITALS
OXYGEN SATURATION: 99 % | RESPIRATION RATE: 16 BRPM | DIASTOLIC BLOOD PRESSURE: 81 MMHG | WEIGHT: 182.1 LBS | TEMPERATURE: 98.4 F | HEART RATE: 84 BPM | SYSTOLIC BLOOD PRESSURE: 134 MMHG | HEIGHT: 65 IN | BODY MASS INDEX: 30.34 KG/M2

## 2024-07-10 DIAGNOSIS — S93.492A SPRAIN OF ANTERIOR TALOFIBULAR LIGAMENT OF LEFT ANKLE, INITIAL ENCOUNTER: Primary | ICD-10-CM

## 2024-07-10 PROCEDURE — 99283 EMERGENCY DEPT VISIT LOW MDM: CPT

## 2024-07-10 PROCEDURE — 6370000000 HC RX 637 (ALT 250 FOR IP): Performed by: EMERGENCY MEDICINE

## 2024-07-10 PROCEDURE — 73610 X-RAY EXAM OF ANKLE: CPT

## 2024-07-10 RX ADMIN — IBUPROFEN 600 MG: 100 SUSPENSION ORAL at 17:03

## 2024-07-10 ASSESSMENT — PAIN DESCRIPTION - ONSET
ONSET: GRADUAL
ONSET: GRADUAL

## 2024-07-10 ASSESSMENT — PAIN SCALES - GENERAL
PAINLEVEL_OUTOF10: 9
PAINLEVEL_OUTOF10: 4

## 2024-07-10 ASSESSMENT — PAIN DESCRIPTION - DESCRIPTORS
DESCRIPTORS: ACHING
DESCRIPTORS: ACHING

## 2024-07-10 ASSESSMENT — PAIN DESCRIPTION - ORIENTATION
ORIENTATION: RIGHT
ORIENTATION: LEFT

## 2024-07-10 ASSESSMENT — PAIN DESCRIPTION - FREQUENCY
FREQUENCY: CONTINUOUS
FREQUENCY: CONTINUOUS

## 2024-07-10 ASSESSMENT — PAIN - FUNCTIONAL ASSESSMENT
PAIN_FUNCTIONAL_ASSESSMENT: 0-10
PAIN_FUNCTIONAL_ASSESSMENT: 0-10

## 2024-07-10 ASSESSMENT — PAIN DESCRIPTION - LOCATION
LOCATION: FOOT
LOCATION: FOOT

## 2024-07-10 ASSESSMENT — PAIN DESCRIPTION - PAIN TYPE
TYPE: ACUTE PAIN
TYPE: ACUTE PAIN

## 2024-07-10 NOTE — ED PROVIDER NOTES
well-developed. She is not diaphoretic.   HENT:      Head: Normocephalic.      Right Ear: Ear canal and external ear normal.      Left Ear: Ear canal and external ear normal.      Nose: Nose normal.      Mouth/Throat:      Mouth: Mucous membranes are moist.   Eyes:      Conjunctiva/sclera: Conjunctivae normal.      Pupils: Pupils are equal, round, and reactive to light.   Neck:      Thyroid: No thyromegaly.   Cardiovascular:      Rate and Rhythm: Normal rate and regular rhythm.      Heart sounds: Normal heart sounds. No murmur heard.     No friction rub. No gallop.   Pulmonary:      Effort: Pulmonary effort is normal. No respiratory distress.      Breath sounds: Normal breath sounds.   Abdominal:      General: Bowel sounds are normal. There is no distension.      Palpations: Abdomen is soft.      Tenderness: There is no abdominal tenderness.   Musculoskeletal:         General: Swelling, tenderness and signs of injury present.      Cervical back: Normal range of motion and neck supple.   Neurological:      Mental Status: She is alert and oriented to person, place, and time.      GCS: GCS eye subscore is 4. GCS verbal subscore is 5. GCS motor subscore is 6.      Cranial Nerves: No cranial nerve deficit.      Sensory: No sensory deficit.      Motor: No abnormal muscle tone.      Coordination: Coordination normal.      Deep Tendon Reflexes: Reflexes normal.   Psychiatric:         Behavior: Behavior normal.         DIAGNOSTIC RESULTS     EKG: All EKG's are interpreted by the Emergency Department Physician who either signs or Co-signs this chart in the absence of a cardiologist.        RADIOLOGY:   Non-plain film images such as CT, Ultrasound and MRI are read by the radiologist. Plain radiographic images are visualized and preliminarily interpreted by the emergency physician with the below findings:        Interpretation per the Radiologist below, if available at the time of this note:    XR ANKLE LEFT (MIN 3 VIEWS)

## 2024-08-28 ENCOUNTER — OFFICE VISIT (OUTPATIENT)
Dept: ENT CLINIC | Age: 64
End: 2024-08-28
Payer: MEDICARE

## 2024-08-28 VITALS
HEIGHT: 65 IN | HEART RATE: 62 BPM | SYSTOLIC BLOOD PRESSURE: 117 MMHG | WEIGHT: 182 LBS | DIASTOLIC BLOOD PRESSURE: 64 MMHG | BODY MASS INDEX: 30.32 KG/M2

## 2024-08-28 DIAGNOSIS — H61.23 BILATERAL IMPACTED CERUMEN: Primary | ICD-10-CM

## 2024-08-28 PROCEDURE — 69210 REMOVE IMPACTED EAR WAX UNI: CPT | Performed by: OTOLARYNGOLOGY

## 2024-08-28 NOTE — PROGRESS NOTES
[facility]  [address], [address]  [place] [address]  P: [phone]       Patient     Yamileth Rueda  1960    ChiefComplaint     Chief Complaint   Patient presents with    Cerumen Impaction       History of Present Illness     Yamileth is a 63-year-old female here today for bilateral cerumen impaction states her ears have been itching just typically because she has wax.    Past Medical History     Past Medical History:   Diagnosis Date    Diabetes mellitus (HCC)     Diverticulosis     Hypertension     Influenza A 2017    Lichen planus     Thyroid disease        Past Surgical History     Past Surgical History:   Procedure Laterality Date    ABDOMEN SURGERY      APPENDECTOMY      CHOLECYSTECTOMY      COLONOSCOPY  2024    COLONOSCOPY POLYPECTOMY SNARE/COLD BIOPSY    COLONOSCOPY N/A 3/7/2024    COLONOSCOPY POLYPECTOMY SNARE/COLD BIOPSY performed by Bunny Bee MD at [facility] ENDOSCOPY    HYSTERECTOMY (CERVIX STATUS UNKNOWN)         Family History     History reviewed. No pertinent family history.    Social History     Social History     Tobacco Use    Smoking status: Former     Current packs/day: 0.00     Average packs/day: 0.3 packs/day for 22.0 years (5.5 ttl pk-yrs)     Types: Cigarettes     Start date:      Quit date:      Years since quittin.6    Smokeless tobacco: Never   Vaping Use    Vaping status: Never Used   Substance Use Topics    Alcohol use: No    Drug use: No        Allergies     Allergies   Allergen Reactions    Sulfa Antibiotics        Medications     Current Outpatient Medications   Medication Sig Dispense Refill    pravastatin (PRAVACHOL) 20 MG tablet Take 1 tablet by mouth daily 90 tablet 1    polyethylene glycol (MIRALAX) 17 GM/SCOOP powder 1 packet mixed with 8 ounces of fluid Orally Once a day for 30 days      zinc gluconate 50 MG tablet Take 1 tablet by mouth daily      CINNAMON PO Take by mouth      Ascorbic Acid (VITAMIN C) 250 MG

## 2024-09-30 RX ORDER — PRAVASTATIN SODIUM 20 MG
20 TABLET ORAL DAILY
Qty: 90 TABLET | Refills: 1 | Status: SHIPPED | OUTPATIENT
Start: 2024-09-30

## 2024-10-23 ENCOUNTER — HOSPITAL ENCOUNTER (EMERGENCY)
Age: 64
Discharge: HOME | End: 2024-10-23
Attending: EMERGENCY MEDICINE
Payer: MEDICARE

## 2024-10-23 ENCOUNTER — APPOINTMENT (OUTPATIENT)
Dept: GENERAL RADIOLOGY | Age: 64
End: 2024-10-23
Payer: MEDICARE

## 2024-10-23 VITALS
HEART RATE: 98 BPM | OXYGEN SATURATION: 96 % | BODY MASS INDEX: 31.24 KG/M2 | TEMPERATURE: 97.4 F | HEIGHT: 65 IN | RESPIRATION RATE: 18 BRPM | SYSTOLIC BLOOD PRESSURE: 159 MMHG | DIASTOLIC BLOOD PRESSURE: 91 MMHG | WEIGHT: 187.5 LBS

## 2024-10-23 DIAGNOSIS — S46.912A STRAIN OF LEFT SHOULDER, INITIAL ENCOUNTER: Primary | ICD-10-CM

## 2024-10-23 PROCEDURE — 73030 X-RAY EXAM OF SHOULDER: CPT

## 2024-10-23 PROCEDURE — 99283 EMERGENCY DEPT VISIT LOW MDM: CPT

## 2024-10-23 ASSESSMENT — PAIN DESCRIPTION - LOCATION: LOCATION: SHOULDER

## 2024-10-23 ASSESSMENT — PAIN DESCRIPTION - FREQUENCY: FREQUENCY: CONTINUOUS

## 2024-10-23 ASSESSMENT — PAIN - FUNCTIONAL ASSESSMENT
PAIN_FUNCTIONAL_ASSESSMENT: 0-10
PAIN_FUNCTIONAL_ASSESSMENT: NONE - DENIES PAIN

## 2024-10-23 ASSESSMENT — PAIN SCALES - GENERAL: PAINLEVEL_OUTOF10: 8

## 2024-10-23 ASSESSMENT — PAIN DESCRIPTION - PAIN TYPE: TYPE: ACUTE PAIN

## 2024-10-23 ASSESSMENT — PAIN DESCRIPTION - ORIENTATION: ORIENTATION: LEFT

## 2024-10-23 ASSESSMENT — PAIN DESCRIPTION - ONSET: ONSET: GRADUAL

## 2024-10-23 ASSESSMENT — PAIN DESCRIPTION - DESCRIPTORS: DESCRIPTORS: ACHING

## 2024-10-23 NOTE — ED PROVIDER NOTES
[facility]  EMERGENCY DEPARTMENT ENCOUNTER        Pt Name: Yamileth Rueda  MRN: [id number]  Birthdate 1960  Date of evaluation: 10/23/2024  Provider: Ivonne Cardona MD  PCP: Bill Gleason MD  Note Started: 6:06 PM EDT 10/23/24    CHIEF COMPLAINT       Chief Complaint   Patient presents with    Shoulder Injury     Pt states she slipped getting off of ladder, did not fall but has left shoulder pain since       HISTORY OF PRESENT ILLNESS: 1 or more Elements     History from : Patient    Limitations to history : None    Yamileth Rueda is a 64 y.o. female who presents to the emergency department with left shoulder injury.  Patient states she was climbing down off a ladder when she slipped.  She grabbed the ladder wrong with her left arm and states that she felt a pop in her left shoulder.  She also has a small abrasion to her left forearm.  No other injuries.    Nursing Notes were all reviewed and agreed with or any disagreements were addressed in the HPI.    REVIEW OF SYSTEMS :      Review of Systems    5 systems reviewed and negative except as in HPI/MDM    SURGICAL HISTORY     Past Surgical History:   Procedure Laterality Date    ABDOMEN SURGERY      APPENDECTOMY      CHOLECYSTECTOMY      COLONOSCOPY  03/07/2024    COLONOSCOPY POLYPECTOMY SNARE/COLD BIOPSY    COLONOSCOPY N/A 3/7/2024    COLONOSCOPY POLYPECTOMY SNARE/COLD BIOPSY performed by Bunny Bee MD at [facility] ENDOSCOPY    HYSTERECTOMY (CERVIX STATUS UNKNOWN)         CURRENTMEDICATIONS       Discharge Medication List as of 10/23/2024  7:30 PM        CONTINUE these medications which have NOT CHANGED    Details   pravastatin (PRAVACHOL) 20 MG tablet TAKE 1 TABLET BY MOUTH DAILY, Disp-90 tablet, R-1Normal      polyethylene glycol (MIRALAX) 17 GM/SCOOP powder 1 packet mixed with 8 ounces of fluid Orally Once a day for 30 daysHistorical Med      zinc gluconate 50 MG tablet Take 1 tablet by mouth dailyHistorical Med

## 2024-11-15 ENCOUNTER — HOSPITAL ENCOUNTER (EMERGENCY)
Age: 64
Discharge: HOME | End: 2024-11-15
Attending: STUDENT IN AN ORGANIZED HEALTH CARE EDUCATION/TRAINING PROGRAM
Payer: MEDICARE

## 2024-11-15 ENCOUNTER — APPOINTMENT (OUTPATIENT)
Dept: GENERAL RADIOLOGY | Age: 64
End: 2024-11-15
Payer: MEDICARE

## 2024-11-15 VITALS
RESPIRATION RATE: 17 BRPM | HEART RATE: 81 BPM | DIASTOLIC BLOOD PRESSURE: 96 MMHG | SYSTOLIC BLOOD PRESSURE: 162 MMHG | TEMPERATURE: 97.2 F | OXYGEN SATURATION: 95 %

## 2024-11-15 DIAGNOSIS — R05.1 ACUTE COUGH: Primary | ICD-10-CM

## 2024-11-15 LAB
FLUAV RNA RESP QL NAA+PROBE: NOT DETECTED
FLUBV RNA RESP QL NAA+PROBE: NOT DETECTED
S PYO AG THROAT QL: NEGATIVE
SARS-COV-2 RNA RESP QL NAA+PROBE: NOT DETECTED

## 2024-11-15 PROCEDURE — 87880 STREP A ASSAY W/OPTIC: CPT

## 2024-11-15 PROCEDURE — 99284 EMERGENCY DEPT VISIT MOD MDM: CPT

## 2024-11-15 PROCEDURE — 71045 X-RAY EXAM CHEST 1 VIEW: CPT

## 2024-11-15 PROCEDURE — 87636 SARSCOV2 & INF A&B AMP PRB: CPT

## 2024-11-15 PROCEDURE — 6360000002 HC RX W HCPCS

## 2024-11-15 RX ORDER — DEXAMETHASONE 4 MG/1
TABLET ORAL
Status: COMPLETED
Start: 2024-11-15 | End: 2024-11-15

## 2024-11-15 RX ORDER — DEXAMETHASONE 4 MG/1
10 TABLET ORAL EVERY 12 HOURS SCHEDULED
Status: DISCONTINUED | OUTPATIENT
Start: 2024-11-15 | End: 2024-11-15 | Stop reason: HOSPADM

## 2024-11-15 RX ADMIN — DEXAMETHASONE 10 MG: 4 TABLET ORAL at 17:03

## 2024-11-15 ASSESSMENT — PAIN DESCRIPTION - DESCRIPTORS: DESCRIPTORS: SORE

## 2024-11-15 ASSESSMENT — PAIN DESCRIPTION - ONSET: ONSET: GRADUAL

## 2024-11-15 ASSESSMENT — PAIN DESCRIPTION - LOCATION: LOCATION: THROAT

## 2024-11-15 ASSESSMENT — PAIN DESCRIPTION - FREQUENCY: FREQUENCY: CONTINUOUS

## 2024-11-15 ASSESSMENT — PAIN DESCRIPTION - PAIN TYPE: TYPE: ACUTE PAIN

## 2024-11-15 ASSESSMENT — PAIN SCALES - GENERAL: PAINLEVEL_OUTOF10: 5

## 2024-11-16 NOTE — ED PROVIDER NOTES
[facility] ED     EMERGENCY DEPARTMENT ENCOUNTER         Pt Name: Yamileth Rueda   MRN: [id number]   Birthdate 1960   Date of evaluation: 11/15/2024   Provider: Miguel Lane MD   PCP: Bill Gleason MD   Note Started: 7:04 PM EST 11/15/24       Chief Complaint     Cough (+sore throat)      History of Present Illness     Yamileth Rueda is a 64 y.o. female who presents with several days of congestion sore throat and nonproductive cough.  The patient has no major concerning past medical history however given the persistence of her illness she presents for evaluation.  She denies fever no sick contacts no travel.      Review of Systems     Positives and pertinent negatives as per HPI.    Past Medical, Surgical, Family, and Social History     She has a past medical history of Diabetes mellitus (HCC), Diverticulosis, Hypertension, Influenza A, Lichen planus, and Thyroid disease.  She has a past surgical history that includes Hysterectomy; Appendectomy; Cholecystectomy; Abdomen surgery; Colonoscopy (03/07/2024); and Colonoscopy (N/A, 3/7/2024).  Her family history is not on file.  She reports that she quit smoking about 25 years ago. Her smoking use included cigarettes. She started smoking about 47 years ago. She has a 5.5 pack-year smoking history. She has never used smokeless tobacco. She reports that she does not drink alcohol and does not use drugs.    SCREENINGS:          Roll Coma Scale  Eye Opening: Spontaneous  Best Verbal Response: Oriented  Best Motor Response: Obeys commands  Roll Coma Scale Score: 15                        CIWA Assessment  BP: (!) 162/96  Pulse: 81               Medications     Discharge Medication List as of 11/15/2024  7:41 PM        CONTINUE these medications which have NOT CHANGED    Details   pravastatin (PRAVACHOL) 20 MG tablet TAKE 1 TABLET BY MOUTH DAILY, Disp-90 tablet, R-1Normal      polyethylene glycol (MIRALAX) 17 GM/SCOOP powder 1 packet

## 2024-11-16 NOTE — DISCHARGE INSTRUCTIONS
You were evaluated in the emergency department for cough. Assessments and testing completed during your visit were reassuring and at this time there is no indication for further testing, treatment or admission to the hospital. Given this it is appropriate to discharge you from the emergency department. At the time of discharge we discussed the following:    Please follow up to primary doctor for further recommendations and return with any new or worsening condition     Please note that sometimes it is difficult to diagnose a medical condition early in the disease process before the disease is fully manifest. Because of this, should you develop any new or worsening symptoms, you may return at any time to the emergency department for another evaluation. If available you are also recommended to review this visit with your primary care physician or other medical provider in the next 7 days. Thank you for allowing us to care for you today.

## 2025-03-28 DIAGNOSIS — Z79.899 MEDICATION MANAGEMENT: Primary | ICD-10-CM

## 2025-03-28 RX ORDER — PRAVASTATIN SODIUM 20 MG
20 TABLET ORAL DAILY
Qty: 90 TABLET | Refills: 1 | Status: SHIPPED | OUTPATIENT
Start: 2025-03-28

## 2025-03-28 NOTE — TELEPHONE ENCOUNTER
Please have the patient obtain fasting lipid profile and LFTs at soonest convenience within the next 1 month prior to our next visit.  Thank you

## 2025-03-28 NOTE — TELEPHONE ENCOUNTER
Last Office Visit: 3/28/2024 Provider: JOSÉ  **Is provider OOT? No    Next Office Visit: 07/09/2025  Provider: JOSÉ    LAST LABS:   Lipid:   Lab Results   Component Value Date    HDL 39 (L) 03/28/2024     (H) 03/28/2024    VLDL 40 03/28/2024     Liver:   Lab Results   Component Value Date    ALT 18 06/27/2024    AST 22 06/27/2024    ALKPHOS 123 06/27/2024    BILITOT 0.9 06/27/2024

## 2025-04-17 ENCOUNTER — HOSPITAL ENCOUNTER (OUTPATIENT)
Age: 65
Discharge: HOME | End: 2025-04-17
Payer: MEDICARE

## 2025-04-17 ENCOUNTER — HOSPITAL ENCOUNTER (EMERGENCY)
Age: 65
Discharge: HOME | End: 2025-04-17
Attending: EMERGENCY MEDICINE
Payer: MEDICARE

## 2025-04-17 ENCOUNTER — APPOINTMENT (OUTPATIENT)
Dept: CT IMAGING | Age: 65
End: 2025-04-17
Payer: MEDICARE

## 2025-04-17 VITALS
DIASTOLIC BLOOD PRESSURE: 97 MMHG | HEIGHT: 65 IN | OXYGEN SATURATION: 98 % | TEMPERATURE: 98 F | HEART RATE: 72 BPM | BODY MASS INDEX: 29.79 KG/M2 | SYSTOLIC BLOOD PRESSURE: 151 MMHG | RESPIRATION RATE: 16 BRPM | WEIGHT: 178.8 LBS

## 2025-04-17 DIAGNOSIS — R42 VERTIGO: Primary | ICD-10-CM

## 2025-04-17 LAB
ALBUMIN SERPL-MCNC: 3.9 G/DL (ref 3.4–5)
ALBUMIN/GLOB SERPL: 1.3 {RATIO} (ref 1.1–2.2)
ALP SERPL-CCNC: 121 U/L (ref 40–129)
ALT SERPL-CCNC: 19 U/L (ref 10–40)
ANION GAP SERPL CALCULATED.3IONS-SCNC: 8 MMOL/L (ref 3–16)
AST SERPL-CCNC: 22 U/L (ref 15–37)
BASOPHILS # BLD: 0.1 K/UL (ref 0–0.2)
BASOPHILS NFR BLD: 1.2 %
BILIRUB SERPL-MCNC: 0.7 MG/DL (ref 0–1)
BUN SERPL-MCNC: 8 MG/DL (ref 7–20)
CALCIUM SERPL-MCNC: 10.3 MG/DL (ref 8.3–10.6)
CHLORIDE SERPL-SCNC: 103 MMOL/L (ref 99–110)
CO2 SERPL-SCNC: 29 MMOL/L (ref 21–32)
CREAT SERPL-MCNC: 0.7 MG/DL (ref 0.6–1.2)
DEPRECATED RDW RBC AUTO: 14.2 % (ref 12.4–15.4)
EKG ATRIAL RATE: 67 BPM
EKG DIAGNOSIS: NORMAL
EKG P AXIS: 43 DEGREES
EKG P-R INTERVAL: 128 MS
EKG Q-T INTERVAL: 396 MS
EKG QRS DURATION: 82 MS
EKG QTC CALCULATION (BAZETT): 418 MS
EKG R AXIS: 21 DEGREES
EKG T AXIS: 38 DEGREES
EKG VENTRICULAR RATE: 67 BPM
EOSINOPHIL # BLD: 0.1 K/UL (ref 0–0.6)
EOSINOPHIL NFR BLD: 1.6 %
GFR SERPLBLD CREATININE-BSD FMLA CKD-EPI: >90 ML/MIN/{1.73_M2}
GLUCOSE SERPL-MCNC: 170 MG/DL (ref 70–99)
HCT VFR BLD AUTO: 46.2 % (ref 36–48)
HGB BLD-MCNC: 15 G/DL (ref 12–16)
LYMPHOCYTES # BLD: 3 K/UL (ref 1–5.1)
LYMPHOCYTES NFR BLD: 38.5 %
MCH RBC QN AUTO: 26.2 PG (ref 26–34)
MCHC RBC AUTO-ENTMCNC: 32.5 G/DL (ref 31–36)
MCV RBC AUTO: 80.6 FL (ref 80–100)
MONOCYTES # BLD: 0.5 K/UL (ref 0–1.3)
MONOCYTES NFR BLD: 6.7 %
NEUTROPHILS # BLD: 4.1 K/UL (ref 1.7–7.7)
NEUTROPHILS NFR BLD: 52 %
PLATELET # BLD AUTO: 260 K/UL (ref 135–450)
PMV BLD AUTO: 7.7 FL (ref 5–10.5)
POTASSIUM SERPL-SCNC: 4.3 MMOL/L (ref 3.5–5.1)
PROT SERPL-MCNC: 6.9 G/DL (ref 6.4–8.2)
RBC # BLD AUTO: 5.73 M/UL (ref 4–5.2)
SODIUM SERPL-SCNC: 140 MMOL/L (ref 136–145)
WBC # BLD AUTO: 7.8 K/UL (ref 4–11)

## 2025-04-17 PROCEDURE — 82043 UR ALBUMIN QUANTITATIVE: CPT

## 2025-04-17 PROCEDURE — 36415 COLL VENOUS BLD VENIPUNCTURE: CPT

## 2025-04-17 PROCEDURE — 82306 VITAMIN D 25 HYDROXY: CPT

## 2025-04-17 PROCEDURE — 83036 HEMOGLOBIN GLYCOSYLATED A1C: CPT

## 2025-04-17 PROCEDURE — 82570 ASSAY OF URINE CREATININE: CPT

## 2025-04-17 PROCEDURE — 84443 ASSAY THYROID STIM HORMONE: CPT

## 2025-04-17 PROCEDURE — 80053 COMPREHEN METABOLIC PANEL: CPT

## 2025-04-17 PROCEDURE — 93005 ELECTROCARDIOGRAM TRACING: CPT | Performed by: EMERGENCY MEDICINE

## 2025-04-17 PROCEDURE — 93010 ELECTROCARDIOGRAM REPORT: CPT | Performed by: INTERNAL MEDICINE

## 2025-04-17 PROCEDURE — 99284 EMERGENCY DEPT VISIT MOD MDM: CPT

## 2025-04-17 PROCEDURE — 80061 LIPID PANEL: CPT

## 2025-04-17 PROCEDURE — 6370000000 HC RX 637 (ALT 250 FOR IP): Performed by: EMERGENCY MEDICINE

## 2025-04-17 PROCEDURE — 85025 COMPLETE CBC W/AUTO DIFF WBC: CPT

## 2025-04-17 PROCEDURE — 70450 CT HEAD/BRAIN W/O DYE: CPT

## 2025-04-17 RX ORDER — MECLIZINE HYDROCHLORIDE 25 MG/1
25 TABLET ORAL 3 TIMES DAILY PRN
Qty: 15 TABLET | Refills: 0 | Status: SHIPPED | OUTPATIENT
Start: 2025-04-17 | End: 2025-04-27

## 2025-04-17 RX ORDER — MECLIZINE HYDROCHLORIDE 25 MG/1
25 TABLET ORAL ONCE
Status: COMPLETED | OUTPATIENT
Start: 2025-04-17 | End: 2025-04-17

## 2025-04-17 RX ADMIN — MECLIZINE HYDROCHLORIDE 25 MG: 25 TABLET ORAL at 15:06

## 2025-04-17 ASSESSMENT — ENCOUNTER SYMPTOMS
VOMITING: 0
SHORTNESS OF BREATH: 0
DIARRHEA: 0
VOICE CHANGE: 0
TROUBLE SWALLOWING: 0
NAUSEA: 0

## 2025-04-17 ASSESSMENT — PAIN - FUNCTIONAL ASSESSMENT: PAIN_FUNCTIONAL_ASSESSMENT: 0-10

## 2025-04-17 ASSESSMENT — PAIN SCALES - GENERAL: PAINLEVEL_OUTOF10: 3

## 2025-04-17 NOTE — ED PROVIDER NOTES
Diagnosis Date    Diabetes mellitus (HCC)     Diverticulosis     Hypertension     Influenza A 03/23/2017    Lichen planus     Thyroid disease          SURGICAL HISTORY       Past Surgical History:   Procedure Laterality Date    ABDOMEN SURGERY      APPENDECTOMY      CHOLECYSTECTOMY      COLONOSCOPY  03/07/2024    COLONOSCOPY POLYPECTOMY SNARE/COLD BIOPSY    COLONOSCOPY N/A 3/7/2024    COLONOSCOPY POLYPECTOMY SNARE/COLD BIOPSY performed by Bunny Bee MD at [facility] ENDOSCOPY    HYSTERECTOMY (CERVIX STATUS UNKNOWN)           CURRENT MEDICATIONS       Current Discharge Medication List        CONTINUE these medications which have NOT CHANGED    Details   pravastatin (PRAVACHOL) 20 MG tablet Take 1 tablet by mouth daily  Qty: 90 tablet, Refills: 1      polyethylene glycol (MIRALAX) 17 GM/SCOOP powder 1 packet mixed with 8 ounces of fluid Orally Once a day for 30 days      zinc gluconate 50 MG tablet Take 1 tablet by mouth daily      CINNAMON PO Take by mouth      Ascorbic Acid (VITAMIN C) 250 MG tablet Take 2 tablets by mouth daily      OYSTER SHELL CALCIUM PLUS D 500-5 MG-MCG TABS Take by mouth daily      glipiZIDE (GLUCOTROL) 5 MG tablet Take 1 tablet by mouth 2 times daily (before meals)      aspirin 81 MG EC tablet Take 1 tablet by mouth daily  Qty: 30 tablet, Refills: 5      Probiotic Product (PROBIOTIC DAILY PO) Take by mouth      levothyroxine (SYNTHROID) 125 MCG tablet Take 1 tablet by mouth Daily      lisinopril (PRINIVIL;ZESTRIL) 20 MG tablet Take 1 tablet by mouth daily             ALLERGIES     Sulfa antibiotics    FAMILY HISTORY     History reviewed. No pertinent family history.       SOCIAL HISTORY       Social History     Socioeconomic History    Marital status:      Spouse name: None    Number of children: None    Years of education: None    Highest education level: None   Tobacco Use    Smoking status: Former     Current packs/day: 0.00     Average packs/day: 0.3 packs/day for 22.0 years  distributions of the face bilaterally. 5/5 strength in all 4 extremities. Normal gait. Normal sensation equal in all 4 extremities. Negative romberg. Normal finger to nose and heel to shin. NIH stroke scale of 0.          DIAGNOSTIC RESULTS     RADIOLOGY:     Interpretation per the Radiologist below, if available at the time of this note:    CT HEAD WO CONTRAST   Final Result   1. No acute intracranial abnormality.   2. Mild volume loss with probable sequela of chronic small vessel ischemic   change.             LABS:  Labs Reviewed   CBC WITH AUTO DIFFERENTIAL - Abnormal; Notable for the following components:       Result Value    RBC 5.73 (*)     All other components within normal limits   COMPREHENSIVE METABOLIC PANEL W/ REFLEX TO MG FOR LOW K - Abnormal; Notable for the following components:    Glucose 170 (*)     All other components within normal limits       All otherlabs were within normal range or not returned as of this dictation.      EMERGENCY DEPARTMENT COURSE and DIFFERENTIAL DIAGNOSIS/MDM:   Vitals:    Vitals:    04/17/25 1405   BP: (!) 157/91   Pulse: 82   Resp: 16   Temp: 98 °F (36.7 °C)   TempSrc: Oral   SpO2: 97%   Weight: 81.1 kg (178 lb 12.8 oz)   Height: 1.651 m (5' 5\")       Patient was given the following medications:  Medications   meclizine (ANTIVERT) tablet 25 mg (25 mg Oral Given 4/17/25 1506)         CC/HPI Summary, DDx, ED Course, Reassessment, Disposition Considerations (include Tests not done, Admit vs D/C, Shared Decision Making, Pt Expectation of Test or Tx.):  Patient mildly hypertensive but is afebrile, nontoxic-appearing, in no acute distress.  Laboratory evaluation shows mild hyperglycemia at 170 but is otherwise unremarkable with no signs of severe electrolyte abnormality or acute endorgan damage.  CT head is obtained in the emergency department and read by radiology as well as independently reviewed by myself and does not show any evidence of acute emergent intracranial

## 2025-04-18 LAB
25(OH)D3 SERPL-MCNC: 31.8 NG/ML
CHOLEST SERPL-MCNC: 205 MG/DL (ref 0–199)
CREAT UR-MCNC: 73 MG/DL (ref 28–259)
EST. AVERAGE GLUCOSE BLD GHB EST-MCNC: 220.2 MG/DL
HBA1C MFR BLD: 9.3 %
HDLC SERPL-MCNC: 37 MG/DL (ref 40–60)
LDL CHOLESTEROL: ABNORMAL MG/DL
LDLC SERPL-MCNC: 116 MG/DL
MICROALBUMIN UR DL<=1MG/L-MCNC: <1.2 MG/DL
MICROALBUMIN/CREAT UR: NORMAL MG/G (ref 0–30)
TRIGL SERPL-MCNC: 319 MG/DL (ref 0–150)
TSH SERPL DL<=0.005 MIU/L-ACNC: 0.16 UIU/ML (ref 0.27–4.2)
VLDLC SERPL CALC-MCNC: ABNORMAL MG/DL

## 2025-04-21 ENCOUNTER — OFFICE VISIT (OUTPATIENT)
Dept: ENT CLINIC | Age: 65
End: 2025-04-21
Payer: MEDICARE

## 2025-04-21 VITALS
HEART RATE: 78 BPM | SYSTOLIC BLOOD PRESSURE: 131 MMHG | WEIGHT: 178 LBS | DIASTOLIC BLOOD PRESSURE: 80 MMHG | BODY MASS INDEX: 29.66 KG/M2 | HEIGHT: 65 IN

## 2025-04-21 DIAGNOSIS — R42 DIZZINESS: ICD-10-CM

## 2025-04-21 DIAGNOSIS — H61.23 BILATERAL IMPACTED CERUMEN: Primary | ICD-10-CM

## 2025-04-21 PROCEDURE — 3079F DIAST BP 80-89 MM HG: CPT | Performed by: STUDENT IN AN ORGANIZED HEALTH CARE EDUCATION/TRAINING PROGRAM

## 2025-04-21 PROCEDURE — 99214 OFFICE O/P EST MOD 30 MIN: CPT | Performed by: STUDENT IN AN ORGANIZED HEALTH CARE EDUCATION/TRAINING PROGRAM

## 2025-04-21 PROCEDURE — 1036F TOBACCO NON-USER: CPT | Performed by: STUDENT IN AN ORGANIZED HEALTH CARE EDUCATION/TRAINING PROGRAM

## 2025-04-21 PROCEDURE — G8417 CALC BMI ABV UP PARAM F/U: HCPCS | Performed by: STUDENT IN AN ORGANIZED HEALTH CARE EDUCATION/TRAINING PROGRAM

## 2025-04-21 PROCEDURE — G8427 DOCREV CUR MEDS BY ELIG CLIN: HCPCS | Performed by: STUDENT IN AN ORGANIZED HEALTH CARE EDUCATION/TRAINING PROGRAM

## 2025-04-21 PROCEDURE — 69210 REMOVE IMPACTED EAR WAX UNI: CPT | Performed by: STUDENT IN AN ORGANIZED HEALTH CARE EDUCATION/TRAINING PROGRAM

## 2025-04-21 PROCEDURE — 3075F SYST BP GE 130 - 139MM HG: CPT | Performed by: STUDENT IN AN ORGANIZED HEALTH CARE EDUCATION/TRAINING PROGRAM

## 2025-04-21 PROCEDURE — 3017F COLORECTAL CA SCREEN DOC REV: CPT | Performed by: STUDENT IN AN ORGANIZED HEALTH CARE EDUCATION/TRAINING PROGRAM

## 2025-04-21 ASSESSMENT — ENCOUNTER SYMPTOMS
VOMITING: 0
RHINORRHEA: 0
SHORTNESS OF BREATH: 0
NAUSEA: 0
COUGH: 0
EYE PAIN: 0

## 2025-04-21 NOTE — PROGRESS NOTES
[facility]  DIVISION OF OTOLARYNGOLOGY- HEAD & NECK SURGERY  CONSULT    Patient Name: Yamileth Rueda  Medical Record Number:  [id number]  Primary Care Physician:  Bill Gleason MD  Date of Consultation: 2025    Chief Complaint:   Chief Complaint   Patient presents with    Cerumen Impaction    Dizziness     Given meds in ER states seems to help.        HISTORY OF PRESENT ILLNESS  Yamileth is a(n) 64 y.o. female who is here for bilateral cerumen impaction.  She was recently in the ER and had a CT scan of her head performed for dizziness.  The dizziness is slowly improving.  Patient Active Problem List   Diagnosis    Hypertension    Chest pain    SOB (shortness of breath)     Past Surgical History:   Procedure Laterality Date    ABDOMEN SURGERY      APPENDECTOMY      CHOLECYSTECTOMY      COLONOSCOPY  2024    COLONOSCOPY POLYPECTOMY SNARE/COLD BIOPSY    COLONOSCOPY N/A 3/7/2024    COLONOSCOPY POLYPECTOMY SNARE/COLD BIOPSY performed by Bunny Bee MD at [facility] ENDOSCOPY    HYSTERECTOMY (CERVIX STATUS UNKNOWN)       History reviewed. No pertinent family history.  Social History     Socioeconomic History    Marital status:      Spouse name: Not on file    Number of children: Not on file    Years of education: Not on file    Highest education level: Not on file   Occupational History    Not on file   Tobacco Use    Smoking status: Former     Current packs/day: 0.00     Average packs/day: 0.3 packs/day for 22.0 years (5.5 ttl pk-yrs)     Types: Cigarettes     Start date:      Quit date:      Years since quittin.3    Smokeless tobacco: Never   Vaping Use    Vaping status: Never Used   Substance and Sexual Activity    Alcohol use: No    Drug use: No    Sexual activity: Never   Other Topics Concern    Not on file   Social History Narrative    Not on file     Social Drivers of Health     Financial Resource Strain: Not on file   Food Insecurity: Not on file   Transportation Needs: Not

## 2025-06-27 ENCOUNTER — HOSPITAL ENCOUNTER (OUTPATIENT)
Dept: LAB | Age: 65
Discharge: HOME | End: 2025-06-27
Payer: MEDICARE

## 2025-06-27 DIAGNOSIS — Z79.899 MEDICATION MANAGEMENT: ICD-10-CM

## 2025-06-27 LAB
ALBUMIN SERPL-MCNC: 4 G/DL (ref 3.4–5)
ALBUMIN/GLOB SERPL: 1.4 {RATIO} (ref 1.1–2.2)
ALP SERPL-CCNC: 110 U/L (ref 40–129)
ALT SERPL-CCNC: 16 U/L (ref 10–40)
ANION GAP SERPL CALCULATED.3IONS-SCNC: 11 MMOL/L (ref 3–16)
AST SERPL-CCNC: 21 U/L (ref 15–37)
BILIRUB SERPL-MCNC: 0.9 MG/DL (ref 0–1)
BUN SERPL-MCNC: 8 MG/DL (ref 7–20)
CALCIUM SERPL-MCNC: 10.2 MG/DL (ref 8.3–10.6)
CHLORIDE SERPL-SCNC: 104 MMOL/L (ref 99–110)
CO2 SERPL-SCNC: 25 MMOL/L (ref 21–32)
CREAT SERPL-MCNC: 0.8 MG/DL (ref 0.6–1.2)
GFR SERPLBLD CREATININE-BSD FMLA CKD-EPI: 83 ML/MIN/{1.73_M2}
GLUCOSE SERPL-MCNC: 179 MG/DL (ref 70–99)
POTASSIUM SERPL-SCNC: 4.2 MMOL/L (ref 3.5–5.1)
PROT SERPL-MCNC: 6.8 G/DL (ref 6.4–8.2)
SODIUM SERPL-SCNC: 140 MMOL/L (ref 136–145)

## 2025-06-27 PROCEDURE — 80053 COMPREHEN METABOLIC PANEL: CPT

## 2025-06-27 PROCEDURE — 36415 COLL VENOUS BLD VENIPUNCTURE: CPT

## 2025-06-27 PROCEDURE — 80061 LIPID PANEL: CPT

## 2025-06-28 LAB
CHOLEST SERPL-MCNC: 218 MG/DL (ref 0–199)
HDLC SERPL-MCNC: 44 MG/DL (ref 40–60)
LDL CHOLESTEROL: 122 MG/DL
TRIGL SERPL-MCNC: 260 MG/DL (ref 0–150)
VLDLC SERPL CALC-MCNC: 52 MG/DL

## 2025-06-30 ENCOUNTER — RESULTS FOLLOW-UP (OUTPATIENT)
Dept: CARDIOLOGY CLINIC | Age: 65
End: 2025-06-30

## 2025-07-08 NOTE — PROGRESS NOTES
visit.      Allergies:  Sulfa antibiotics       Objective:     Vitals:    07/09/25 1403   BP: 118/84   Pulse: 85   SpO2: 96%        Weight - Scale: 80.9 kg (178 lb 6.4 oz)       PHYSICAL EXAM:    General:  Pleasant woman, no acute distress    Head:  Normocephalic, atraumatic   Eyes:  Conjunctiva/corneas clear, anicteric sclerae    Nose: Nares normal, no drainage or sinus tenderness   Throat: No abnormalities of the lips, oral mucosa or tongue.    Neck: Trachea midline. Neck supple    Lungs:   Clear to auscultation bilaterally, no wheezes, no rales, no respiratory distress   Chest Wall:  No deformity or tenderness to palpation   Heart:  Regular rate and rhythm, normal S1, normal S2, no murmur, no rub, no S3/S4    Abdomen:   Soft, non-tender, with normoactive bowel sounds.     Extremities: No cyanosis, clubbing or pitting edema.    Vascular: 2+ radial, 2+ dorsalis pedis and posterior tibial pulses bilaterally. Brisk carotid upstrokes without carotid bruit.    Skin: Skin color, texture, turgor are normal with no rashes or ulceration.    Pysch: Normal mood, inappropriate affect   Neurologic: Oriented to person, place and time. No deficits        Labs:   CBC:   Lab Results   Component Value Date/Time    WBC 7.8 04/17/2025 03:12 PM    RBC 5.73 04/17/2025 03:12 PM    HGB 15.0 04/17/2025 03:12 PM    HCT 46.2 04/17/2025 03:12 PM    MCV 80.6 04/17/2025 03:12 PM    RDW 14.2 04/17/2025 03:12 PM     04/17/2025 03:12 PM     CMP:  Lab Results   Component Value Date/Time     06/27/2025 12:13 PM    K 4.2 06/27/2025 12:13 PM    K 4.3 04/17/2025 03:12 PM     06/27/2025 12:13 PM    CO2 25 06/27/2025 12:13 PM    BUN 8 06/27/2025 12:13 PM    CREATININE 0.8 06/27/2025 12:13 PM    GFRAA >60 04/18/2022 04:18 PM    AGRATIO 1.4 06/27/2025 12:13 PM    LABGLOM 83 06/27/2025 12:13 PM    LABGLOM >90 03/28/2024 12:10 PM    GLUCOSE 179 06/27/2025 12:13 PM    CALCIUM 10.2 06/27/2025 12:13 PM    BILITOT 0.9 06/27/2025 12:13 PM

## 2025-07-09 ENCOUNTER — OFFICE VISIT (OUTPATIENT)
Dept: CARDIOLOGY CLINIC | Age: 65
End: 2025-07-09
Payer: MEDICARE

## 2025-07-09 VITALS
DIASTOLIC BLOOD PRESSURE: 84 MMHG | HEART RATE: 85 BPM | SYSTOLIC BLOOD PRESSURE: 118 MMHG | HEIGHT: 65 IN | OXYGEN SATURATION: 96 % | BODY MASS INDEX: 29.72 KG/M2 | WEIGHT: 178.4 LBS

## 2025-07-09 DIAGNOSIS — I25.10 CORONARY ARTERY CALCIFICATION SEEN ON CAT SCAN: Primary | ICD-10-CM

## 2025-07-09 DIAGNOSIS — E78.2 MIXED HYPERLIPIDEMIA: ICD-10-CM

## 2025-07-09 DIAGNOSIS — I10 PRIMARY HYPERTENSION: ICD-10-CM

## 2025-07-09 PROCEDURE — G2211 COMPLEX E/M VISIT ADD ON: HCPCS | Performed by: INTERNAL MEDICINE

## 2025-07-09 PROCEDURE — G8417 CALC BMI ABV UP PARAM F/U: HCPCS | Performed by: INTERNAL MEDICINE

## 2025-07-09 PROCEDURE — 3079F DIAST BP 80-89 MM HG: CPT | Performed by: INTERNAL MEDICINE

## 2025-07-09 PROCEDURE — 99214 OFFICE O/P EST MOD 30 MIN: CPT | Performed by: INTERNAL MEDICINE

## 2025-07-09 PROCEDURE — G8427 DOCREV CUR MEDS BY ELIG CLIN: HCPCS | Performed by: INTERNAL MEDICINE

## 2025-07-09 PROCEDURE — 3074F SYST BP LT 130 MM HG: CPT | Performed by: INTERNAL MEDICINE

## 2025-07-09 PROCEDURE — 3017F COLORECTAL CA SCREEN DOC REV: CPT | Performed by: INTERNAL MEDICINE

## 2025-07-09 PROCEDURE — 1036F TOBACCO NON-USER: CPT | Performed by: INTERNAL MEDICINE

## 2025-07-09 RX ORDER — PRAVASTATIN SODIUM 20 MG
20 TABLET ORAL DAILY
Qty: 90 TABLET | Refills: 3 | Status: SHIPPED | OUTPATIENT
Start: 2025-07-09 | End: 2025-07-09 | Stop reason: SDUPTHER

## 2025-07-09 RX ORDER — PRAVASTATIN SODIUM 40 MG
40 TABLET ORAL DAILY
Qty: 90 TABLET | Refills: 1 | Status: SHIPPED | OUTPATIENT
Start: 2025-07-09

## 2025-07-09 NOTE — PATIENT INSTRUCTIONS
1.  Increase the pravastatin to 40 mg.  You may double up on what you currently have.  New prescription for the 40 mg tablet will be sent to your pharmacy.   2.  Continue all other medications as prescribed   3.  No cardiac testing warranted at this time

## 2025-07-29 ENCOUNTER — TELEPHONE (OUTPATIENT)
Dept: ENT CLINIC | Age: 65
End: 2025-07-29

## 2025-07-29 NOTE — TELEPHONE ENCOUNTER
Patient called to schedule an appointment with Dr. Donaldson. Patient was informed that she is established w/ Dr. Lester and would need to resume seeing Dr. Lester since she has returned from leave. The patient is requesting a transfer of care due to not being happy with the care provided by Dr. Lester. Please advise.

## 2025-08-19 ENCOUNTER — OFFICE VISIT (OUTPATIENT)
Dept: ENT CLINIC | Age: 65
End: 2025-08-19
Payer: MEDICARE

## 2025-08-19 VITALS
BODY MASS INDEX: 29.66 KG/M2 | DIASTOLIC BLOOD PRESSURE: 87 MMHG | SYSTOLIC BLOOD PRESSURE: 128 MMHG | HEART RATE: 71 BPM | WEIGHT: 178 LBS | HEIGHT: 65 IN

## 2025-08-19 DIAGNOSIS — R42 DIZZINESS: ICD-10-CM

## 2025-08-19 DIAGNOSIS — H61.23 BILATERAL IMPACTED CERUMEN: Primary | ICD-10-CM

## 2025-08-19 DIAGNOSIS — H93.12 TINNITUS OF LEFT EAR: ICD-10-CM

## 2025-08-19 PROCEDURE — 3017F COLORECTAL CA SCREEN DOC REV: CPT | Performed by: STUDENT IN AN ORGANIZED HEALTH CARE EDUCATION/TRAINING PROGRAM

## 2025-08-19 PROCEDURE — 69210 REMOVE IMPACTED EAR WAX UNI: CPT | Performed by: STUDENT IN AN ORGANIZED HEALTH CARE EDUCATION/TRAINING PROGRAM

## 2025-08-19 PROCEDURE — 3074F SYST BP LT 130 MM HG: CPT | Performed by: STUDENT IN AN ORGANIZED HEALTH CARE EDUCATION/TRAINING PROGRAM

## 2025-08-19 PROCEDURE — G8427 DOCREV CUR MEDS BY ELIG CLIN: HCPCS | Performed by: STUDENT IN AN ORGANIZED HEALTH CARE EDUCATION/TRAINING PROGRAM

## 2025-08-19 PROCEDURE — 99213 OFFICE O/P EST LOW 20 MIN: CPT | Performed by: STUDENT IN AN ORGANIZED HEALTH CARE EDUCATION/TRAINING PROGRAM

## 2025-08-19 PROCEDURE — G8417 CALC BMI ABV UP PARAM F/U: HCPCS | Performed by: STUDENT IN AN ORGANIZED HEALTH CARE EDUCATION/TRAINING PROGRAM

## 2025-08-19 PROCEDURE — 1036F TOBACCO NON-USER: CPT | Performed by: STUDENT IN AN ORGANIZED HEALTH CARE EDUCATION/TRAINING PROGRAM

## 2025-08-19 PROCEDURE — 3079F DIAST BP 80-89 MM HG: CPT | Performed by: STUDENT IN AN ORGANIZED HEALTH CARE EDUCATION/TRAINING PROGRAM

## (undated) DEVICE — ELECTRODE,RADIOTRANSLUCENT,FOAM,3PK: Brand: MEDLINE

## (undated) DEVICE — SNARE ENDOSCP L240CM SHTH DIA24MM LOOP W10MM POLYP RND REINF

## (undated) DEVICE — ENDO CARRY-ON PROCEDURE KIT INCLUDES SUCTION TUBING, LUBRICANT, GAUZE, BIOHAZARD STICKER, TRANSPORT PAD AND INTERCEPT BEDSIDE KIT.: Brand: ENDO CARRY-ON PROCEDURE KIT

## (undated) DEVICE — TRAP POLYP ETRAP